# Patient Record
Sex: FEMALE | Race: BLACK OR AFRICAN AMERICAN | NOT HISPANIC OR LATINO | Employment: FULL TIME | URBAN - METROPOLITAN AREA
[De-identification: names, ages, dates, MRNs, and addresses within clinical notes are randomized per-mention and may not be internally consistent; named-entity substitution may affect disease eponyms.]

---

## 2017-02-08 LAB
HIGH RISK HPV 16 (PRECISION): POSITIVE
HIGH RISK HPV 18/45 (PRECISION): NEGATIVE
PAP RECOMMENDATION EXT: NORMAL
PAP SMEAR: NORMAL

## 2017-02-27 ENCOUNTER — HISTORICAL (OUTPATIENT)
Dept: RADIOLOGY | Facility: HOSPITAL | Age: 52
End: 2017-02-27

## 2017-03-14 ENCOUNTER — HISTORICAL (OUTPATIENT)
Dept: INTERNAL MEDICINE | Facility: CLINIC | Age: 52
End: 2017-03-14

## 2018-10-25 ENCOUNTER — HISTORICAL (OUTPATIENT)
Dept: ADMINISTRATIVE | Facility: HOSPITAL | Age: 53
End: 2018-10-25

## 2018-10-25 LAB
ABS NEUT (OLG): 2.92 X10(3)/MCL (ref 2.1–9.2)
ALBUMIN SERPL-MCNC: 3.9 GM/DL (ref 3.4–5)
ALBUMIN/GLOB SERPL: 1 RATIO (ref 1–2)
ALP SERPL-CCNC: 97 UNIT/L (ref 45–117)
ALT SERPL-CCNC: 43 UNIT/L (ref 12–78)
APPEARANCE, UA: CLEAR
AST SERPL-CCNC: 37 UNIT/L (ref 15–37)
BACTERIA #/AREA URNS AUTO: ABNORMAL /[HPF]
BASOPHILS # BLD AUTO: 0.03 X10(3)/MCL
BASOPHILS NFR BLD AUTO: 1 %
BILIRUB SERPL-MCNC: 0.5 MG/DL (ref 0.2–1)
BILIRUB UR QL STRIP: NEGATIVE
BILIRUBIN DIRECT+TOT PNL SERPL-MCNC: 0.2 MG/DL
BILIRUBIN DIRECT+TOT PNL SERPL-MCNC: 0.3 MG/DL
BUN SERPL-MCNC: 11 MG/DL (ref 7–18)
CALCIUM SERPL-MCNC: 9.2 MG/DL (ref 8.5–10.1)
CHLORIDE SERPL-SCNC: 106 MMOL/L (ref 98–107)
CHOLEST SERPL-MCNC: 218 MG/DL
CHOLEST/HDLC SERPL: 2.3 {RATIO} (ref 0–4.4)
CO2 SERPL-SCNC: 28 MMOL/L (ref 21–32)
COLOR UR: ABNORMAL
CREAT SERPL-MCNC: 0.8 MG/DL (ref 0.6–1.3)
CREAT UR-MCNC: <13 MG/DL
EOSINOPHIL # BLD AUTO: 0.06 X10(3)/MCL
EOSINOPHIL NFR BLD AUTO: 1 %
ERYTHROCYTE [DISTWIDTH] IN BLOOD BY AUTOMATED COUNT: 13.9 % (ref 11.5–14.5)
EST. AVERAGE GLUCOSE BLD GHB EST-MCNC: 108 MG/DL
GLOBULIN SER-MCNC: 4.3 GM/ML (ref 2.3–3.5)
GLUCOSE (UA): NORMAL
GLUCOSE SERPL-MCNC: 93 MG/DL (ref 74–106)
HAV IGM SERPL QL IA: NONREACTIVE
HBA1C MFR BLD: 5.4 % (ref 4.2–6.3)
HBV CORE IGM SERPL QL IA: NONREACTIVE
HBV SURFACE AG SERPL QL IA: NEGATIVE
HCT VFR BLD AUTO: 38.1 % (ref 35–46)
HCV AB SERPL QL IA: NONREACTIVE
HDLC SERPL-MCNC: 93 MG/DL
HGB BLD-MCNC: 12.3 GM/DL (ref 12–16)
HGB UR QL STRIP: NEGATIVE
HIV 1+2 AB+HIV1 P24 AG SERPL QL IA: NONREACTIVE
HYALINE CASTS #/AREA URNS LPF: ABNORMAL /[LPF]
IMM GRANULOCYTES # BLD AUTO: 0.01 10*3/UL
IMM GRANULOCYTES NFR BLD AUTO: 0 %
KETONES UR QL STRIP: NEGATIVE
LDLC SERPL CALC-MCNC: 114 MG/DL (ref 0–130)
LEUKOCYTE ESTERASE UR QL STRIP: 250 LEU/UL
LYMPHOCYTES # BLD AUTO: 1.99 X10(3)/MCL
LYMPHOCYTES NFR BLD AUTO: 37 % (ref 13–40)
MCH RBC QN AUTO: 28.3 PG (ref 26–34)
MCHC RBC AUTO-ENTMCNC: 32.3 GM/DL (ref 31–37)
MCV RBC AUTO: 87.8 FL (ref 80–100)
MICROALBUMIN UR-MCNC: <5 MG/L (ref 0–19)
MONOCYTES # BLD AUTO: 0.34 X10(3)/MCL
MONOCYTES NFR BLD AUTO: 6 % (ref 4–12)
NEUTROPHILS # BLD AUTO: 2.92 X10(3)/MCL
NEUTROPHILS NFR BLD AUTO: 54 X10(3)/MCL
NITRITE UR QL STRIP: NEGATIVE
PH UR STRIP: 7.5 [PH] (ref 4.5–8)
PLATELET # BLD AUTO: 195 X10(3)/MCL (ref 130–400)
PMV BLD AUTO: 11.7 FL (ref 7.4–10.4)
POTASSIUM SERPL-SCNC: 4.1 MMOL/L (ref 3.5–5.1)
PROT SERPL-MCNC: 8.2 GM/DL (ref 6.4–8.2)
PROT UR QL STRIP: NEGATIVE
RBC # BLD AUTO: 4.34 X10(6)/MCL (ref 4–5.2)
RBC #/AREA URNS AUTO: ABNORMAL /[HPF]
SODIUM SERPL-SCNC: 139 MMOL/L (ref 136–145)
SP GR UR STRIP: 1 (ref 1–1.03)
SQUAMOUS #/AREA URNS LPF: ABNORMAL /[LPF]
TRIGL SERPL-MCNC: 56 MG/DL
TSH SERPL-ACNC: 1.35 MIU/L (ref 0.36–3.74)
UROBILINOGEN UR STRIP-ACNC: NORMAL
VLDLC SERPL CALC-MCNC: 11 MG/DL
WBC # SPEC AUTO: 5.4 X10(3)/MCL (ref 4.5–11)
WBC #/AREA URNS AUTO: ABNORMAL /HPF

## 2018-10-29 ENCOUNTER — HISTORICAL (OUTPATIENT)
Dept: LAB | Facility: HOSPITAL | Age: 53
End: 2018-10-29

## 2019-06-05 ENCOUNTER — HISTORICAL (OUTPATIENT)
Dept: ADMINISTRATIVE | Facility: HOSPITAL | Age: 54
End: 2019-06-05

## 2020-03-09 ENCOUNTER — HISTORICAL (OUTPATIENT)
Dept: RADIOLOGY | Facility: HOSPITAL | Age: 55
End: 2020-03-09

## 2020-07-16 ENCOUNTER — HISTORICAL (OUTPATIENT)
Dept: LAB | Facility: HOSPITAL | Age: 55
End: 2020-07-16

## 2020-07-16 LAB
ABS NEUT (OLG): 3.69 X10(3)/MCL (ref 2.1–9.2)
ALBUMIN SERPL-MCNC: 3.6 GM/DL (ref 3.4–5)
ALBUMIN/GLOB SERPL: 0.8 RATIO (ref 1.1–2)
ALP SERPL-CCNC: 95 UNIT/L (ref 45–117)
ALT SERPL-CCNC: 23 UNIT/L (ref 12–78)
ANISOCYTOSIS BLD QL SMEAR: NORMAL
AST SERPL-CCNC: 22 UNIT/L (ref 15–37)
BASOPHILS # BLD AUTO: 0 X10(3)/MCL (ref 0–0.2)
BASOPHILS NFR BLD AUTO: 1 %
BILIRUB SERPL-MCNC: 0.6 MG/DL (ref 0.2–1)
BILIRUBIN DIRECT+TOT PNL SERPL-MCNC: 0.2 MG/DL (ref 0–0.2)
BILIRUBIN DIRECT+TOT PNL SERPL-MCNC: 0.4 MG/DL
BUN SERPL-MCNC: 8 MG/DL (ref 7–18)
CALCIUM SERPL-MCNC: 9 MG/DL (ref 8.5–10.1)
CHLORIDE SERPL-SCNC: 106 MMOL/L (ref 98–107)
CHOLEST SERPL-MCNC: 236 MG/DL
CHOLEST/HDLC SERPL: 3 {RATIO} (ref 0–4.4)
CO2 SERPL-SCNC: 24 MMOL/L (ref 21–32)
CREAT SERPL-MCNC: 1 MG/DL (ref 0.6–1.3)
EOSINOPHIL # BLD AUTO: 0.1 X10(3)/MCL (ref 0–0.9)
EOSINOPHIL NFR BLD AUTO: 2 %
ERYTHROCYTE [DISTWIDTH] IN BLOOD BY AUTOMATED COUNT: 18.6 % (ref 11.5–14.5)
EST. AVERAGE GLUCOSE BLD GHB EST-MCNC: 105 MG/DL
GLOBULIN SER-MCNC: 4.3 GM/ML (ref 2.3–3.5)
GLUCOSE SERPL-MCNC: 115 MG/DL (ref 74–106)
HBA1C MFR BLD: 5.3 % (ref 4.2–6.3)
HCT VFR BLD AUTO: 25.9 % (ref 35–46)
HDLC SERPL-MCNC: 80 MG/DL (ref 40–59)
HGB BLD-MCNC: 7.5 GM/DL (ref 12–16)
HYPOCHROMIA BLD QL SMEAR: NORMAL
IMM GRANULOCYTES # BLD AUTO: 0.01 10*3/UL
IMM GRANULOCYTES NFR BLD AUTO: 0 %
LDLC SERPL CALC-MCNC: 141 MG/DL
LYMPHOCYTES # BLD AUTO: 1.8 X10(3)/MCL (ref 0.6–4.6)
LYMPHOCYTES NFR BLD AUTO: 29 %
MCH RBC QN AUTO: 21.4 PG (ref 26–34)
MCHC RBC AUTO-ENTMCNC: 29 GM/DL (ref 31–37)
MCV RBC AUTO: 73.8 FL (ref 80–100)
MICROCYTES BLD QL SMEAR: NORMAL
MONOCYTES # BLD AUTO: 0.6 X10(3)/MCL (ref 0.1–1.3)
MONOCYTES NFR BLD AUTO: 10 %
NEUTROPHILS # BLD AUTO: 3.69 X10(3)/MCL (ref 2.1–9.2)
NEUTROPHILS NFR BLD AUTO: 59 %
PLATELET # BLD AUTO: 294 X10(3)/MCL (ref 130–400)
PLATELET # BLD EST: ADEQUATE 10*3/UL
PMV BLD AUTO: 10.3 FL (ref 7.4–10.4)
POIKILOCYTOSIS BLD QL SMEAR: NORMAL
POTASSIUM SERPL-SCNC: 3.8 MMOL/L (ref 3.5–5.1)
PROT SERPL-MCNC: 7.9 GM/DL (ref 6.4–8.2)
RBC # BLD AUTO: 3.51 X10(6)/MCL (ref 4–5.2)
RBC MORPH BLD: NORMAL
SODIUM SERPL-SCNC: 138 MMOL/L (ref 136–145)
TRIGL SERPL-MCNC: 77 MG/DL
TSH SERPL-ACNC: 0.83 MIU/L (ref 0.36–3.74)
VLDLC SERPL CALC-MCNC: 15 MG/DL
WBC # SPEC AUTO: 6.3 X10(3)/MCL (ref 4.5–11)

## 2020-11-17 ENCOUNTER — HISTORICAL (OUTPATIENT)
Dept: LAB | Facility: HOSPITAL | Age: 55
End: 2020-11-17

## 2021-05-26 ENCOUNTER — HISTORICAL (OUTPATIENT)
Dept: LAB | Facility: HOSPITAL | Age: 56
End: 2021-05-26

## 2021-05-26 LAB
ABS NEUT (OLG): 3.57 X10(3)/MCL (ref 2.1–9.2)
ANISOCYTOSIS BLD QL SMEAR: NORMAL
BASOPHILS NFR BLD MANUAL: 0 %
DACRYOCYTES BLD QL SMEAR: NORMAL
EOSINOPHIL NFR BLD MANUAL: 3 %
ERYTHROCYTE [DISTWIDTH] IN BLOOD BY AUTOMATED COUNT: ABNORMAL % (ref 11.5–14.5)
GRANULOCYTES NFR BLD MANUAL: 50 % (ref 43–75)
HCT VFR BLD AUTO: 29.2 % (ref 35–46)
HGB BLD-MCNC: 8.4 GM/DL (ref 12–16)
HYPOCHROMIA BLD QL SMEAR: NORMAL
LYMPHOCYTES NFR BLD MANUAL: 38 % (ref 20.5–51.1)
MCH RBC QN AUTO: 20.5 PG (ref 26–34)
MCHC RBC AUTO-ENTMCNC: 28.8 GM/DL (ref 31–37)
MCV RBC AUTO: 71.4 FL (ref 80–100)
MICROCYTES BLD QL SMEAR: NORMAL
MONOCYTES NFR BLD MANUAL: 9 % (ref 2–9)
PLATELET # BLD AUTO: 203 X10(3)/MCL (ref 130–400)
PLATELET # BLD EST: ADEQUATE 10*3/UL
PMV BLD AUTO: ABNORMAL FL (ref 7.4–10.4)
POIKILOCYTOSIS BLD QL SMEAR: NORMAL
POLYCHROMASIA BLD QL SMEAR: NORMAL
RBC # BLD AUTO: 4.09 X10(6)/MCL (ref 4–5.2)
RBC MORPH BLD: NORMAL
SCHISTOCYTES BLD QL AUTO: NORMAL
WBC # SPEC AUTO: 6.5 X10(3)/MCL (ref 4.5–11)

## 2021-07-28 LAB — CRC RECOMMENDATION EXT: NORMAL

## 2021-08-19 ENCOUNTER — HISTORICAL (OUTPATIENT)
Dept: RADIOLOGY | Facility: HOSPITAL | Age: 56
End: 2021-08-19

## 2021-08-19 LAB — POC CREATININE: 0.9 MG/DL (ref 0.6–1.3)

## 2021-09-07 ENCOUNTER — HISTORICAL (OUTPATIENT)
Dept: ADMINISTRATIVE | Facility: HOSPITAL | Age: 56
End: 2021-09-07

## 2021-09-07 LAB — SARS-COV-2 RNA RESP QL NAA+PROBE: NOT DETECTED

## 2021-10-18 ENCOUNTER — HISTORICAL (OUTPATIENT)
Dept: ADMINISTRATIVE | Facility: HOSPITAL | Age: 56
End: 2021-10-18

## 2021-10-18 LAB
ABS NEUT (OLG): 5.09 X10(3)/MCL (ref 2.1–9.2)
ALBUMIN SERPL-MCNC: 4.4 GM/DL (ref 3.5–5)
ALBUMIN/GLOB SERPL: 1.3 RATIO (ref 1.1–2)
ALP SERPL-CCNC: 112 UNIT/L (ref 40–150)
ALT SERPL-CCNC: 28 UNIT/L (ref 0–55)
AST SERPL-CCNC: 26 UNIT/L (ref 5–34)
BASOPHILS # BLD AUTO: 0 X10(3)/MCL (ref 0–0.2)
BASOPHILS NFR BLD AUTO: 0.3 %
BILIRUB SERPL-MCNC: 0.8 MG/DL
BILIRUBIN DIRECT+TOT PNL SERPL-MCNC: 0.3 MG/DL (ref 0–0.5)
BILIRUBIN DIRECT+TOT PNL SERPL-MCNC: 0.5 MG/DL (ref 0–0.8)
BUN SERPL-MCNC: 9.5 MG/DL (ref 9.8–20.1)
CALCIUM SERPL-MCNC: 10 MG/DL (ref 8.7–10.5)
CHLORIDE SERPL-SCNC: 106 MMOL/L (ref 98–107)
CO2 SERPL-SCNC: 26 MMOL/L (ref 22–29)
CREAT SERPL-MCNC: 0.8 MG/DL (ref 0.55–1.02)
EOSINOPHIL # BLD AUTO: 0 X10(3)/MCL (ref 0–0.9)
EOSINOPHIL NFR BLD AUTO: 0.7 %
ERYTHROCYTE [DISTWIDTH] IN BLOOD BY AUTOMATED COUNT: 16.2 % (ref 11.5–17)
FERRITIN SERPL-MCNC: 24.8 NG/ML (ref 4.63–204)
FOLATE SERPL-MCNC: 17.6 NG/ML (ref 7–31.4)
GLOBULIN SER-MCNC: 3.5 GM/DL (ref 2.4–3.5)
GLUCOSE SERPL-MCNC: 88 MG/DL (ref 74–100)
HCT VFR BLD AUTO: 37 % (ref 37–47)
HGB BLD-MCNC: 11.3 GM/DL (ref 12–16)
IRON SATN MFR SERPL: 14 % (ref 20–50)
IRON SERPL-MCNC: 49 UG/DL (ref 50–170)
LYMPHOCYTES # BLD AUTO: 1.3 X10(3)/MCL (ref 0.6–4.6)
LYMPHOCYTES NFR BLD AUTO: 19.2 %
MCH RBC QN AUTO: 27.3 PG (ref 27–31)
MCHC RBC AUTO-ENTMCNC: 30.5 GM/DL (ref 33–36)
MCV RBC AUTO: 89.4 FL (ref 80–94)
MONOCYTES # BLD AUTO: 0.4 X10(3)/MCL (ref 0.1–1.3)
MONOCYTES NFR BLD AUTO: 5.3 %
NEUTROPHILS # BLD AUTO: 5.1 X10(3)/MCL (ref 2.1–9.2)
NEUTROPHILS NFR BLD AUTO: 74.4 %
PLATELET # BLD AUTO: 268 X10(3)/MCL (ref 130–400)
PMV BLD AUTO: 10.7 FL (ref 9.4–12.4)
POTASSIUM SERPL-SCNC: 4 MMOL/L (ref 3.5–5.1)
PROT SERPL-MCNC: 7.9 GM/DL (ref 6.4–8.3)
RBC # BLD AUTO: 4.14 X10(6)/MCL (ref 4.2–5.4)
SODIUM SERPL-SCNC: 142 MMOL/L (ref 136–145)
TIBC SERPL-MCNC: 310 UG/DL (ref 70–310)
TIBC SERPL-MCNC: 359 UG/DL (ref 250–450)
TRANSFERRIN SERPL-MCNC: 341 MG/DL (ref 180–382)
VIT B12 SERPL-MCNC: 1467 PG/ML (ref 213–816)
WBC # SPEC AUTO: 6.8 X10(3)/MCL (ref 4.5–11.5)

## 2021-10-26 ENCOUNTER — HISTORICAL (OUTPATIENT)
Dept: PREADMISSION TESTING | Facility: HOSPITAL | Age: 56
End: 2021-10-26

## 2021-10-26 LAB — SARS-COV-2 RNA RESP QL NAA+PROBE: NOT DETECTED

## 2021-10-29 ENCOUNTER — HISTORICAL (OUTPATIENT)
Dept: SURGERY | Facility: HOSPITAL | Age: 56
End: 2021-10-29

## 2021-11-01 ENCOUNTER — HISTORICAL (OUTPATIENT)
Dept: INFUSION THERAPY | Facility: HOSPITAL | Age: 56
End: 2021-11-01

## 2021-11-03 ENCOUNTER — HISTORICAL (OUTPATIENT)
Dept: INFUSION THERAPY | Facility: HOSPITAL | Age: 56
End: 2021-11-03

## 2021-11-08 ENCOUNTER — HISTORICAL (OUTPATIENT)
Dept: ADMINISTRATIVE | Facility: HOSPITAL | Age: 56
End: 2021-11-08

## 2021-11-08 LAB
ABS NEUT (OLG): 4.18 X10(3)/MCL (ref 2.1–9.2)
BASOPHILS # BLD AUTO: 0 X10(3)/MCL (ref 0–0.2)
BASOPHILS NFR BLD AUTO: 0.3 %
EOSINOPHIL # BLD AUTO: 0.1 X10(3)/MCL (ref 0–0.9)
EOSINOPHIL NFR BLD AUTO: 1.1 %
ERYTHROCYTE [DISTWIDTH] IN BLOOD BY AUTOMATED COUNT: 15.6 % (ref 11.5–17)
HCT VFR BLD AUTO: 37.2 % (ref 37–47)
HGB BLD-MCNC: 11.7 GM/DL (ref 12–16)
LYMPHOCYTES # BLD AUTO: 1.7 X10(3)/MCL (ref 0.6–4.6)
LYMPHOCYTES NFR BLD AUTO: 26.5 %
MCH RBC QN AUTO: 27.3 PG (ref 27–31)
MCHC RBC AUTO-ENTMCNC: 31.5 GM/DL (ref 33–36)
MCV RBC AUTO: 86.7 FL (ref 80–94)
MONOCYTES # BLD AUTO: 0.4 X10(3)/MCL (ref 0.1–1.3)
MONOCYTES NFR BLD AUTO: 6.1 %
NEUTROPHILS # BLD AUTO: 4.2 X10(3)/MCL (ref 2.1–9.2)
NEUTROPHILS NFR BLD AUTO: 65.7 %
PLATELET # BLD AUTO: 188 X10(3)/MCL (ref 130–400)
PMV BLD AUTO: 11.5 FL (ref 9.4–12.4)
RBC # BLD AUTO: 4.29 X10(6)/MCL (ref 4.2–5.4)
WBC # SPEC AUTO: 6.4 X10(3)/MCL (ref 4.5–11.5)

## 2021-11-15 ENCOUNTER — HISTORICAL (OUTPATIENT)
Dept: INFUSION THERAPY | Facility: HOSPITAL | Age: 56
End: 2021-11-15

## 2021-11-15 LAB
ANION GAP SERPL CALC-SCNC: 17 MMOL/L
BUN SERPL-MCNC: 8 MG/DL (ref 8–26)
CHLORIDE SERPL-SCNC: 102 MMOL/L (ref 98–109)
CREAT SERPL-MCNC: 0.8 MG/DL (ref 0.6–1.3)
GLUCOSE SERPL-MCNC: 97 MG/DL (ref 70–105)
HCT VFR BLD CALC: 39 % (ref 38–51)
HGB BLD-MCNC: 13.3 MG/DL (ref 12–17)
POC IONIZED CALCIUM: 1.24 MMOL/L (ref 1.12–1.32)
POC TCO2: 25 MMOL/L (ref 24–29)
POTASSIUM BLD-SCNC: 4.2 MMOL/L (ref 3.5–4.9)
SODIUM BLD-SCNC: 140 MMOL/L (ref 138–146)

## 2021-11-17 ENCOUNTER — HISTORICAL (OUTPATIENT)
Dept: INFUSION THERAPY | Facility: HOSPITAL | Age: 56
End: 2021-11-17

## 2021-11-29 ENCOUNTER — HISTORICAL (OUTPATIENT)
Dept: ADMINISTRATIVE | Facility: HOSPITAL | Age: 56
End: 2021-11-29

## 2021-11-29 LAB
ABS NEUT (OLG): 3.28 X10(3)/MCL (ref 2.1–9.2)
ALP SERPL-CCNC: 117 UNIT/L (ref 40–150)
ALT SERPL-CCNC: 17 UNIT/L (ref 0–55)
ANION GAP SERPL CALC-SCNC: 18 MMOL/L
AST SERPL-CCNC: 27 UNIT/L (ref 5–34)
BASOPHILS # BLD AUTO: 0 X10(3)/MCL (ref 0–0.2)
BASOPHILS NFR BLD AUTO: 0.4 %
BUN SERPL-MCNC: 13 MG/DL (ref 8–26)
CHLORIDE SERPL-SCNC: 103 MMOL/L (ref 98–109)
CREAT SERPL-MCNC: 0.8 MG/DL (ref 0.6–1.3)
EOSINOPHIL # BLD AUTO: 0 X10(3)/MCL (ref 0–0.9)
EOSINOPHIL NFR BLD AUTO: 0.6 %
ERYTHROCYTE [DISTWIDTH] IN BLOOD BY AUTOMATED COUNT: 17.1 % (ref 11.5–17)
GLUCOSE SERPL-MCNC: 107 MG/DL (ref 70–105)
HCT VFR BLD AUTO: 39.1 % (ref 37–47)
HCT VFR BLD CALC: 41 % (ref 38–51)
HGB BLD-MCNC: 12.4 GM/DL (ref 12–16)
HGB BLD-MCNC: 13.9 MG/DL (ref 12–17)
LYMPHOCYTES # BLD AUTO: 1.3 X10(3)/MCL (ref 0.6–4.6)
LYMPHOCYTES NFR BLD AUTO: 26.1 %
MCH RBC QN AUTO: 27.3 PG (ref 27–31)
MCHC RBC AUTO-ENTMCNC: 31.7 GM/DL (ref 33–36)
MCV RBC AUTO: 85.9 FL (ref 80–94)
MONOCYTES # BLD AUTO: 0.4 X10(3)/MCL (ref 0.1–1.3)
MONOCYTES NFR BLD AUTO: 8.4 %
NEUTROPHILS # BLD AUTO: 3.3 X10(3)/MCL (ref 2.1–9.2)
NEUTROPHILS NFR BLD AUTO: 64.3 %
PLATELET # BLD AUTO: 127 X10(3)/MCL (ref 130–400)
PMV BLD AUTO: 9.8 FL (ref 9.4–12.4)
POC IONIZED CALCIUM: 1.26 MMOL/L (ref 1.12–1.32)
POC TCO2: 25 MMOL/L (ref 24–29)
POTASSIUM BLD-SCNC: 3.5 MMOL/L (ref 3.5–4.9)
RBC # BLD AUTO: 4.55 X10(6)/MCL (ref 4.2–5.4)
SODIUM BLD-SCNC: 141 MMOL/L (ref 138–146)
WBC # SPEC AUTO: 5.1 X10(3)/MCL (ref 4.5–11.5)

## 2021-11-30 ENCOUNTER — HISTORICAL (OUTPATIENT)
Dept: INFUSION THERAPY | Facility: HOSPITAL | Age: 56
End: 2021-11-30

## 2021-12-02 ENCOUNTER — HISTORICAL (OUTPATIENT)
Dept: INFUSION THERAPY | Facility: HOSPITAL | Age: 56
End: 2021-12-02

## 2021-12-13 ENCOUNTER — HISTORICAL (OUTPATIENT)
Dept: ADMINISTRATIVE | Facility: HOSPITAL | Age: 56
End: 2021-12-13

## 2021-12-13 LAB
ABS NEUT (OLG): 2.51 X10(3)/MCL (ref 2.1–9.2)
ALP SERPL-CCNC: 116 UNIT/L (ref 40–150)
ALT SERPL-CCNC: 30 UNIT/L (ref 0–55)
ANION GAP SERPL CALC-SCNC: 16 MMOL/L
AST SERPL-CCNC: 38 UNIT/L (ref 5–34)
BASOPHILS # BLD AUTO: 0 X10(3)/MCL (ref 0–0.2)
BASOPHILS NFR BLD AUTO: 0.2 %
BUN SERPL-MCNC: 10 MG/DL (ref 8–26)
CHLORIDE SERPL-SCNC: 104 MMOL/L (ref 98–109)
CREAT SERPL-MCNC: 0.9 MG/DL (ref 0.6–1.3)
EOSINOPHIL # BLD AUTO: 0.1 X10(3)/MCL (ref 0–0.9)
EOSINOPHIL NFR BLD AUTO: 1.7 %
ERYTHROCYTE [DISTWIDTH] IN BLOOD BY AUTOMATED COUNT: 17.7 % (ref 11.5–17)
GLUCOSE SERPL-MCNC: 74 MG/DL (ref 70–105)
HCT VFR BLD AUTO: 37.9 % (ref 37–47)
HCT VFR BLD CALC: 40 % (ref 38–51)
HGB BLD-MCNC: 12 GM/DL (ref 12–16)
HGB BLD-MCNC: 13.6 MG/DL (ref 12–17)
LYMPHOCYTES # BLD AUTO: 1.1 X10(3)/MCL (ref 0.6–4.6)
LYMPHOCYTES NFR BLD AUTO: 25.5 %
MCH RBC QN AUTO: 27.5 PG (ref 27–31)
MCHC RBC AUTO-ENTMCNC: 31.7 GM/DL (ref 33–36)
MCV RBC AUTO: 86.7 FL (ref 80–94)
MONOCYTES # BLD AUTO: 0.5 X10(3)/MCL (ref 0.1–1.3)
MONOCYTES NFR BLD AUTO: 12.3 %
NEUTROPHILS # BLD AUTO: 2.5 X10(3)/MCL (ref 2.1–9.2)
NEUTROPHILS NFR BLD AUTO: 60.3 %
PLATELET # BLD AUTO: 147 X10(3)/MCL (ref 130–400)
PMV BLD AUTO: 10 FL (ref 9.4–12.4)
POC IONIZED CALCIUM: 1.24 MMOL/L (ref 1.12–1.32)
POC TCO2: 26 MMOL/L (ref 24–29)
POTASSIUM BLD-SCNC: 3.8 MMOL/L (ref 3.5–4.9)
RBC # BLD AUTO: 4.37 X10(6)/MCL (ref 4.2–5.4)
SODIUM BLD-SCNC: 141 MMOL/L (ref 138–146)
WBC # SPEC AUTO: 4.2 X10(3)/MCL (ref 4.5–11.5)

## 2021-12-14 ENCOUNTER — HISTORICAL (OUTPATIENT)
Dept: INFUSION THERAPY | Facility: HOSPITAL | Age: 56
End: 2021-12-14

## 2021-12-16 ENCOUNTER — HISTORICAL (OUTPATIENT)
Dept: INFUSION THERAPY | Facility: HOSPITAL | Age: 56
End: 2021-12-16

## 2021-12-27 ENCOUNTER — HISTORICAL (OUTPATIENT)
Dept: ADMINISTRATIVE | Facility: HOSPITAL | Age: 56
End: 2021-12-27

## 2021-12-27 LAB
ABS NEUT (OLG): 2.65 X10(3)/MCL (ref 2.1–9.2)
ALT SERPL-CCNC: 52 UNIT/L (ref 0–55)
ANION GAP SERPL CALC-SCNC: 16 MMOL/L
AST SERPL-CCNC: 55 UNIT/L (ref 5–34)
BASOPHILS # BLD AUTO: 0 X10(3)/MCL (ref 0–0.2)
BASOPHILS NFR BLD AUTO: 0.4 %
BUN SERPL-MCNC: 8 MG/DL (ref 8–26)
CHLORIDE SERPL-SCNC: 102 MMOL/L (ref 98–109)
CREAT SERPL-MCNC: 0.8 MG/DL (ref 0.6–1.3)
EOSINOPHIL # BLD AUTO: 0 X10(3)/MCL (ref 0–0.9)
EOSINOPHIL NFR BLD AUTO: 0.8 %
ERYTHROCYTE [DISTWIDTH] IN BLOOD BY AUTOMATED COUNT: 18.3 % (ref 11.5–17)
GLUCOSE SERPL-MCNC: 88 MG/DL (ref 70–105)
HCT VFR BLD AUTO: 37.4 % (ref 37–47)
HCT VFR BLD CALC: 40 % (ref 38–51)
HGB BLD-MCNC: 12.1 GM/DL (ref 12–16)
HGB BLD-MCNC: 13.6 MG/DL (ref 12–17)
LYMPHOCYTES # BLD AUTO: 1.4 X10(3)/MCL (ref 0.6–4.6)
LYMPHOCYTES NFR BLD AUTO: 29.4 %
MCH RBC QN AUTO: 28.1 PG (ref 27–31)
MCHC RBC AUTO-ENTMCNC: 32.4 GM/DL (ref 33–36)
MCV RBC AUTO: 87 FL (ref 80–94)
MONOCYTES # BLD AUTO: 0.7 X10(3)/MCL (ref 0.1–1.3)
MONOCYTES NFR BLD AUTO: 14.6 %
NEUTROPHILS # BLD AUTO: 2.6 X10(3)/MCL (ref 2.1–9.2)
NEUTROPHILS NFR BLD AUTO: 54.6 %
PLATELET # BLD AUTO: 94 X10(3)/MCL (ref 130–400)
PMV BLD AUTO: 9.7 FL (ref 9.4–12.4)
POC IONIZED CALCIUM: 1.25 MMOL/L (ref 1.12–1.32)
POC TCO2: 26 MMOL/L (ref 24–29)
POTASSIUM BLD-SCNC: 3.5 MMOL/L (ref 3.5–4.9)
RBC # BLD AUTO: 4.3 X10(6)/MCL (ref 4.2–5.4)
SODIUM BLD-SCNC: 140 MMOL/L (ref 138–146)
WBC # SPEC AUTO: 4.9 X10(3)/MCL (ref 4.5–11.5)

## 2021-12-28 ENCOUNTER — HISTORICAL (OUTPATIENT)
Dept: INFUSION THERAPY | Facility: HOSPITAL | Age: 56
End: 2021-12-28

## 2021-12-30 ENCOUNTER — HISTORICAL (OUTPATIENT)
Dept: INFUSION THERAPY | Facility: HOSPITAL | Age: 56
End: 2021-12-30

## 2022-01-10 ENCOUNTER — HISTORICAL (OUTPATIENT)
Dept: ADMINISTRATIVE | Facility: HOSPITAL | Age: 57
End: 2022-01-10

## 2022-01-10 LAB
ABS NEUT (OLG): 2.14 X10(3)/MCL (ref 2.1–9.2)
ALBUMIN SERPL-MCNC: 3.8 GM/DL (ref 3.5–5)
ALBUMIN/GLOB SERPL: 1 RATIO (ref 1.1–2)
ALP SERPL-CCNC: 128 UNIT/L (ref 40–150)
ALT SERPL-CCNC: 43 UNIT/L (ref 0–55)
AST SERPL-CCNC: 59 UNIT/L (ref 5–34)
BASOPHILS # BLD AUTO: 0 X10(3)/MCL (ref 0–0.2)
BASOPHILS NFR BLD AUTO: 0.3 %
BILIRUB SERPL-MCNC: 0.6 MG/DL
BILIRUBIN DIRECT+TOT PNL SERPL-MCNC: 0.3 MG/DL (ref 0–0.5)
BILIRUBIN DIRECT+TOT PNL SERPL-MCNC: 0.3 MG/DL (ref 0–0.8)
BUN SERPL-MCNC: 9.5 MG/DL (ref 9.8–20.1)
CALCIUM SERPL-MCNC: 9.2 MG/DL (ref 8.7–10.5)
CHLORIDE SERPL-SCNC: 107 MMOL/L (ref 98–107)
CO2 SERPL-SCNC: 23 MMOL/L (ref 22–29)
CREAT SERPL-MCNC: 0.82 MG/DL (ref 0.55–1.02)
EOSINOPHIL # BLD AUTO: 0.1 X10(3)/MCL (ref 0–0.9)
EOSINOPHIL NFR BLD AUTO: 1.8 %
ERYTHROCYTE [DISTWIDTH] IN BLOOD BY AUTOMATED COUNT: 18.7 % (ref 11.5–17)
GLOBULIN SER-MCNC: 3.7 GM/DL (ref 2.4–3.5)
GLUCOSE SERPL-MCNC: 85 MG/DL (ref 74–100)
HCT VFR BLD AUTO: 37.5 % (ref 37–47)
HGB BLD-MCNC: 12.2 GM/DL (ref 12–16)
LYMPHOCYTES # BLD AUTO: 1.2 X10(3)/MCL (ref 0.6–4.6)
LYMPHOCYTES NFR BLD AUTO: 29 %
MCH RBC QN AUTO: 28.6 PG (ref 27–31)
MCHC RBC AUTO-ENTMCNC: 32.5 GM/DL (ref 33–36)
MCV RBC AUTO: 88 FL (ref 80–94)
MONOCYTES # BLD AUTO: 0.6 X10(3)/MCL (ref 0.1–1.3)
MONOCYTES NFR BLD AUTO: 15.1 %
NEUTROPHILS # BLD AUTO: 2.1 X10(3)/MCL (ref 2.1–9.2)
NEUTROPHILS NFR BLD AUTO: 53.8 %
PLATELET # BLD AUTO: 92 X10(3)/MCL (ref 130–400)
PMV BLD AUTO: 11.6 FL (ref 9.4–12.4)
POTASSIUM SERPL-SCNC: 3.4 MMOL/L (ref 3.5–5.1)
PROT SERPL-MCNC: 7.5 GM/DL (ref 6.4–8.3)
RBC # BLD AUTO: 4.26 X10(6)/MCL (ref 4.2–5.4)
SODIUM SERPL-SCNC: 141 MMOL/L (ref 136–145)
WBC # SPEC AUTO: 4 X10(3)/MCL (ref 4.5–11.5)

## 2022-01-11 ENCOUNTER — HISTORICAL (OUTPATIENT)
Dept: INFUSION THERAPY | Facility: HOSPITAL | Age: 57
End: 2022-01-11

## 2022-01-13 ENCOUNTER — HISTORICAL (OUTPATIENT)
Dept: INFUSION THERAPY | Facility: HOSPITAL | Age: 57
End: 2022-01-13

## 2022-01-24 ENCOUNTER — HISTORICAL (OUTPATIENT)
Dept: ADMINISTRATIVE | Facility: HOSPITAL | Age: 57
End: 2022-01-24

## 2022-01-24 LAB
ABS NEUT (OLG): 1.9 X10(3)/MCL (ref 2.1–9.2)
ALP SERPL-CCNC: 150 UNIT/L (ref 40–150)
ALT SERPL-CCNC: 45 UNIT/L (ref 0–55)
ANION GAP SERPL CALC-SCNC: 15 MMOL/L
AST SERPL-CCNC: 54 UNIT/L (ref 5–34)
BASOPHILS # BLD AUTO: 0 X10(3)/MCL (ref 0–0.2)
BASOPHILS NFR BLD AUTO: 0.5 %
BUN SERPL-MCNC: 4 MG/DL (ref 8–26)
CHLORIDE SERPL-SCNC: 104 MMOL/L (ref 98–109)
CREAT SERPL-MCNC: 0.7 MG/DL (ref 0.6–1.3)
EOSINOPHIL # BLD AUTO: 0 X10(3)/MCL (ref 0–0.9)
EOSINOPHIL NFR BLD AUTO: 1.3 %
ERYTHROCYTE [DISTWIDTH] IN BLOOD BY AUTOMATED COUNT: 18.9 % (ref 11.5–17)
GLUCOSE SERPL-MCNC: 84 MG/DL (ref 70–105)
HCT VFR BLD AUTO: 37.1 % (ref 37–47)
HCT VFR BLD CALC: 40 % (ref 38–51)
HGB BLD-MCNC: 11.9 GM/DL (ref 12–16)
HGB BLD-MCNC: 13.6 MG/DL (ref 12–17)
LYMPHOCYTES # BLD AUTO: 1.2 X10(3)/MCL (ref 0.6–4.6)
LYMPHOCYTES NFR BLD AUTO: 31.5 %
MCH RBC QN AUTO: 28.7 PG (ref 27–31)
MCHC RBC AUTO-ENTMCNC: 32.1 GM/DL (ref 33–36)
MCV RBC AUTO: 89.6 FL (ref 80–94)
MONOCYTES # BLD AUTO: 0.7 X10(3)/MCL (ref 0.1–1.3)
MONOCYTES NFR BLD AUTO: 18.5 %
NEUTROPHILS # BLD AUTO: 1.9 X10(3)/MCL (ref 2.1–9.2)
NEUTROPHILS NFR BLD AUTO: 48.2 %
PLATELET # BLD AUTO: 97 X10(3)/MCL (ref 130–400)
PMV BLD AUTO: ABNORMAL FL (ref 9.4–12.4)
POC IONIZED CALCIUM: 1.24 MMOL/L (ref 1.12–1.32)
POC TCO2: 26 MMOL/L (ref 24–29)
POTASSIUM BLD-SCNC: 3.4 MMOL/L (ref 3.5–4.9)
RBC # BLD AUTO: 4.14 X10(6)/MCL (ref 4.2–5.4)
SODIUM BLD-SCNC: 140 MMOL/L (ref 138–146)
WBC # SPEC AUTO: 3.9 X10(3)/MCL (ref 4.5–11.5)

## 2022-01-25 ENCOUNTER — HISTORICAL (OUTPATIENT)
Dept: INFUSION THERAPY | Facility: HOSPITAL | Age: 57
End: 2022-01-25

## 2022-01-27 ENCOUNTER — HISTORICAL (OUTPATIENT)
Dept: INFUSION THERAPY | Facility: HOSPITAL | Age: 57
End: 2022-01-27

## 2022-02-07 ENCOUNTER — HISTORICAL (OUTPATIENT)
Dept: ADMINISTRATIVE | Facility: HOSPITAL | Age: 57
End: 2022-02-07

## 2022-02-07 LAB
ABS NEUT (OLG): 1.36 (ref 2.1–9.2)
ALBUMIN SERPL-MCNC: 3.8 G/DL (ref 3.5–5)
ALBUMIN/GLOB SERPL: 1 {RATIO} (ref 1.1–2)
ALP SERPL-CCNC: 147 U/L (ref 40–150)
ALT SERPL-CCNC: 40 U/L (ref 0–55)
AST SERPL-CCNC: 56 U/L (ref 5–34)
BASOPHILS # BLD AUTO: 0 10*3/UL (ref 0–0.2)
BASOPHILS NFR BLD AUTO: 0.6 %
BILIRUB SERPL-MCNC: 0.6 MG/DL
BILIRUBIN DIRECT+TOT PNL SERPL-MCNC: 0.3 (ref 0–0.5)
BILIRUBIN DIRECT+TOT PNL SERPL-MCNC: 0.3 (ref 0–0.8)
BUN SERPL-MCNC: 9.7 MG/DL (ref 9.8–20.1)
CALCIUM SERPL-MCNC: 9.7 MG/DL (ref 8.7–10.5)
CHLORIDE SERPL-SCNC: 106 MMOL/L (ref 98–107)
CO2 SERPL-SCNC: 25 MMOL/L (ref 22–29)
CREAT SERPL-MCNC: 0.8 MG/DL (ref 0.55–1.02)
EOSINOPHIL # BLD AUTO: 0 10*3/UL (ref 0–0.9)
EOSINOPHIL NFR BLD AUTO: 1.5 %
ERYTHROCYTE [DISTWIDTH] IN BLOOD BY AUTOMATED COUNT: 19 % (ref 11.5–17)
GLOBULIN SER-MCNC: 3.8 G/DL (ref 2.4–3.5)
GLUCOSE SERPL-MCNC: 80 MG/DL (ref 74–100)
HCT VFR BLD AUTO: 36.8 % (ref 37–47)
HEMOLYSIS INTERF INDEX SERPL-ACNC: 9
HGB BLD-MCNC: 11.9 G/DL (ref 12–16)
ICTERIC INTERF INDEX SERPL-ACNC: 1
LIPEMIC INTERF INDEX SERPL-ACNC: 3
LYMPHOCYTES # BLD AUTO: 1 10*3/UL (ref 0.6–4.6)
LYMPHOCYTES NFR BLD AUTO: 30.8 %
MANUAL DIFF? (OHS): NO
MCH RBC QN AUTO: 29.4 PG (ref 27–31)
MCHC RBC AUTO-ENTMCNC: 32.3 G/DL (ref 33–36)
MCV RBC AUTO: 90.9 FL (ref 80–94)
MONOCYTES # BLD AUTO: 0.8 10*3/UL (ref 0.1–1.3)
MONOCYTES NFR BLD AUTO: 25.6 %
NEUTROPHILS # BLD AUTO: 1.4 10*3/UL (ref 2.1–9.2)
NEUTROPHILS NFR BLD AUTO: 41.5 %
PLATELET # BLD AUTO: 70 10*3/UL (ref 130–400)
PMV BLD AUTO: NORMAL FL (ref 9.4–12.4)
POTASSIUM SERPL-SCNC: 3.5 MMOL/L (ref 3.5–5.1)
PROT SERPL-MCNC: 7.6 G/DL (ref 6.4–8.3)
RBC # BLD AUTO: 4.05 10*6/UL (ref 4.2–5.4)
SODIUM SERPL-SCNC: 140 MMOL/L (ref 136–145)
WBC # SPEC AUTO: 3.3 10*3/UL (ref 4.5–11.5)

## 2022-02-08 ENCOUNTER — HISTORICAL (OUTPATIENT)
Dept: INFUSION THERAPY | Facility: HOSPITAL | Age: 57
End: 2022-02-08

## 2022-02-10 ENCOUNTER — HISTORICAL (OUTPATIENT)
Dept: INFUSION THERAPY | Facility: HOSPITAL | Age: 57
End: 2022-02-10

## 2022-02-18 ENCOUNTER — HISTORICAL (OUTPATIENT)
Dept: HEMATOLOGY/ONCOLOGY | Facility: CLINIC | Age: 57
End: 2022-02-18

## 2022-02-18 LAB
ABS NEUT (OLG): 1.49 (ref 2.1–9.2)
ALBUMIN SERPL-MCNC: 3.8 G/DL (ref 3.5–5)
ALBUMIN/GLOB SERPL: 1 {RATIO} (ref 1.1–2)
ALP SERPL-CCNC: 141 U/L (ref 40–150)
ALT SERPL-CCNC: 54 U/L (ref 0–55)
AST SERPL-CCNC: 62 U/L (ref 5–34)
BASOPHILS # BLD AUTO: 0 10*3/UL (ref 0–0.2)
BASOPHILS NFR BLD AUTO: 0.3 %
BILIRUB SERPL-MCNC: 0.7 MG/DL
BILIRUBIN DIRECT+TOT PNL SERPL-MCNC: 0.3 (ref 0–0.5)
BILIRUBIN DIRECT+TOT PNL SERPL-MCNC: 0.4 (ref 0–0.8)
BUN SERPL-MCNC: 7.9 MG/DL (ref 9.8–20.1)
CALCIUM SERPL-MCNC: 9.7 MG/DL (ref 8.7–10.5)
CEA SERPL-MCNC: <1.73 NG/ML (ref 0–3)
CHLORIDE SERPL-SCNC: 104 MMOL/L (ref 98–107)
CO2 SERPL-SCNC: 23 MMOL/L (ref 22–29)
CREAT SERPL-MCNC: 0.9 MG/DL (ref 0.55–1.02)
EOSINOPHIL # BLD AUTO: 0 10*3/UL (ref 0–0.9)
EOSINOPHIL NFR BLD AUTO: 0.6 %
ERYTHROCYTE [DISTWIDTH] IN BLOOD BY AUTOMATED COUNT: 19.8 % (ref 11.5–17)
FERRITIN SERPL-MCNC: 205.78 NG/ML (ref 4.63–204)
GLOBULIN SER-MCNC: 3.8 G/DL (ref 2.4–3.5)
GLUCOSE SERPL-MCNC: 88 MG/DL (ref 74–100)
HCT VFR BLD AUTO: 36.6 % (ref 37–47)
HEMOLYSIS INTERF INDEX SERPL-ACNC: 9
HGB BLD-MCNC: 11.8 G/DL (ref 12–16)
ICTERIC INTERF INDEX SERPL-ACNC: 1
IRON SATN MFR SERPL: 29 % (ref 20–50)
IRON SERPL-MCNC: 113 UG/DL (ref 50–170)
LIPEMIC INTERF INDEX SERPL-ACNC: 9
LYMPHOCYTES # BLD AUTO: 1.4 10*3/UL (ref 0.6–4.6)
LYMPHOCYTES NFR BLD AUTO: 41.9 %
MANUAL DIFF? (OHS): NO
MCH RBC QN AUTO: 29.9 PG (ref 27–31)
MCHC RBC AUTO-ENTMCNC: 32.2 G/DL (ref 33–36)
MCV RBC AUTO: 92.9 FL (ref 80–94)
MONOCYTES # BLD AUTO: 0.5 10*3/UL (ref 0.1–1.3)
MONOCYTES NFR BLD AUTO: 13.7 %
NEUTROPHILS # BLD AUTO: 1.5 10*3/UL (ref 2.1–9.2)
NEUTROPHILS NFR BLD AUTO: 43.2 %
PLATELET # BLD AUTO: 100 10*3/UL (ref 130–400)
PMV BLD AUTO: 11.9 FL (ref 9.4–12.4)
POTASSIUM SERPL-SCNC: 3.6 MMOL/L (ref 3.5–5.1)
PROT SERPL-MCNC: 7.6 G/DL (ref 6.4–8.3)
RBC # BLD AUTO: 3.94 10*6/UL (ref 4.2–5.4)
SODIUM SERPL-SCNC: 141 MMOL/L (ref 136–145)
TIBC SERPL-MCNC: 272 UG/DL (ref 70–310)
TIBC SERPL-MCNC: 385 UG/DL (ref 250–450)
TRANSFERRIN SERPL-MCNC: 350 MG/DL (ref 180–382)
WBC # SPEC AUTO: 3.4 10*3/UL (ref 4.5–11.5)

## 2022-02-22 ENCOUNTER — HISTORICAL (OUTPATIENT)
Dept: INFUSION THERAPY | Facility: HOSPITAL | Age: 57
End: 2022-02-22

## 2022-02-24 ENCOUNTER — HISTORICAL (OUTPATIENT)
Dept: INFUSION THERAPY | Facility: HOSPITAL | Age: 57
End: 2022-02-24

## 2022-03-04 DIAGNOSIS — C18.2 MALIGNANT NEOPLASM OF ASCENDING COLON: ICD-10-CM

## 2022-03-07 ENCOUNTER — HISTORICAL (OUTPATIENT)
Dept: ADMINISTRATIVE | Facility: HOSPITAL | Age: 57
End: 2022-03-07

## 2022-03-07 LAB
ABS NEUT (OLG): 2.44 (ref 2.1–9.2)
ALP SERPL-CCNC: 152 U/L (ref 40–150)
ALT SERPL-CCNC: 43 U/L (ref 0–55)
ANION GAP SERPL CALC-SCNC: 14 MMOL/L
AST SERPL-CCNC: 51 U/L (ref 5–34)
BASOPHILS # BLD AUTO: 0 10*3/UL (ref 0–0.2)
BASOPHILS NFR BLD AUTO: 0.2 %
BUN SERPL-MCNC: 11 MG/DL (ref 8–26)
CHLORIDE SERPL-SCNC: 105 MMOL/L (ref 98–109)
CREAT SERPL-MCNC: 0.7 MG/DL (ref 0.6–1.3)
EOSINOPHIL # BLD AUTO: 0 10*3/UL (ref 0–0.9)
EOSINOPHIL NFR BLD AUTO: 0.8 %
ERYTHROCYTE [DISTWIDTH] IN BLOOD BY AUTOMATED COUNT: 18.7 % (ref 11.5–17)
GLUCOSE SERPL-MCNC: 83 MG/DL (ref 70–105)
HCT VFR BLD AUTO: 36 % (ref 37–47)
HCT VFR BLD CALC: 38 % (ref 38–51)
HEMOLYSIS INTERF INDEX SERPL-ACNC: 7
HGB BLD-MCNC: 11.5 G/DL (ref 12–16)
HGB BLD-MCNC: 12.9 G/DL (ref 12–17)
ICTERIC INTERF INDEX SERPL-ACNC: 1
LIPEMIC INTERF INDEX SERPL-ACNC: 6
LYMPHOCYTES # BLD AUTO: 1.5 10*3/UL (ref 0.6–4.6)
LYMPHOCYTES NFR BLD AUTO: 30.1 %
MANUAL DIFF? (OHS): NO
MCH RBC QN AUTO: 30.7 PG (ref 27–31)
MCHC RBC AUTO-ENTMCNC: 31.9 G/DL (ref 33–36)
MCV RBC AUTO: 96 FL (ref 80–94)
MONOCYTES # BLD AUTO: 0.9 10*3/UL (ref 0.1–1.3)
MONOCYTES NFR BLD AUTO: 18.4 %
NEUTROPHILS # BLD AUTO: 2.4 10*3/UL (ref 2.1–9.2)
NEUTROPHILS NFR BLD AUTO: 50.3 %
PLATELET # BLD AUTO: 152 10*3/UL (ref 130–400)
PMV BLD AUTO: 12.8 FL (ref 9.4–12.4)
POC IONIZED CALCIUM: 1.32 (ref 1.12–1.32)
POC TCO2: 25 (ref 24–29)
POTASSIUM BLD-SCNC: 3.7 MMOL/L (ref 3.5–4.9)
RBC # BLD AUTO: 3.75 10*6/UL (ref 4.2–5.4)
SODIUM BLD-SCNC: 140 MMOL/L (ref 138–146)
WBC # SPEC AUTO: 4.8 10*3/UL (ref 4.5–11.5)

## 2022-03-08 ENCOUNTER — HISTORICAL (OUTPATIENT)
Dept: INFUSION THERAPY | Facility: HOSPITAL | Age: 57
End: 2022-03-08

## 2022-03-10 ENCOUNTER — HISTORICAL (OUTPATIENT)
Dept: INFUSION THERAPY | Facility: HOSPITAL | Age: 57
End: 2022-03-10

## 2022-03-21 ENCOUNTER — HISTORICAL (OUTPATIENT)
Dept: ADMINISTRATIVE | Facility: HOSPITAL | Age: 57
End: 2022-03-21

## 2022-03-21 LAB
ABS NEUT (OLG): 2.48 (ref 2.1–9.2)
ALBUMIN SERPL-MCNC: 3.8 G/DL (ref 3.5–5)
ALBUMIN/GLOB SERPL: 1 {RATIO} (ref 1.1–2)
ALP SERPL-CCNC: 150 U/L (ref 40–150)
ALT SERPL-CCNC: 38 U/L (ref 0–55)
AST SERPL-CCNC: 50 U/L (ref 5–34)
BASOPHILS # BLD AUTO: 0 10*3/UL (ref 0–0.2)
BASOPHILS NFR BLD AUTO: 0.6 %
BILIRUB SERPL-MCNC: 0.9 MG/DL
BILIRUBIN DIRECT+TOT PNL SERPL-MCNC: 0.4 (ref 0–0.5)
BILIRUBIN DIRECT+TOT PNL SERPL-MCNC: 0.5 (ref 0–0.8)
BUN SERPL-MCNC: 13.4 MG/DL (ref 9.8–20.1)
CALCIUM SERPL-MCNC: 9.6 MG/DL (ref 8.7–10.5)
CHLORIDE SERPL-SCNC: 104 MMOL/L (ref 98–107)
CO2 SERPL-SCNC: 26 MMOL/L (ref 22–29)
CREAT SERPL-MCNC: 0.88 MG/DL (ref 0.55–1.02)
EOSINOPHIL # BLD AUTO: 0 10*3/UL (ref 0–0.9)
EOSINOPHIL NFR BLD AUTO: 1 %
ERYTHROCYTE [DISTWIDTH] IN BLOOD BY AUTOMATED COUNT: 17.7 % (ref 11.5–17)
GLOBULIN SER-MCNC: 3.9 G/DL (ref 2.4–3.5)
GLUCOSE SERPL-MCNC: 80 MG/DL (ref 74–100)
HCT VFR BLD AUTO: 37 % (ref 37–47)
HEMOLYSIS INTERF INDEX SERPL-ACNC: 11
HGB BLD-MCNC: 11.8 G/DL (ref 12–16)
ICTERIC INTERF INDEX SERPL-ACNC: 1
LIPEMIC INTERF INDEX SERPL-ACNC: 6
LYMPHOCYTES # BLD AUTO: 1.4 10*3/UL (ref 0.6–4.6)
LYMPHOCYTES NFR BLD AUTO: 29.2 %
MANUAL DIFF? (OHS): NO
MCH RBC QN AUTO: 31.2 PG (ref 27–31)
MCHC RBC AUTO-ENTMCNC: 31.9 G/DL (ref 33–36)
MCV RBC AUTO: 97.9 FL (ref 80–94)
MONOCYTES # BLD AUTO: 0.9 10*3/UL (ref 0.1–1.3)
MONOCYTES NFR BLD AUTO: 19 %
NEUTROPHILS # BLD AUTO: 2.5 10*3/UL (ref 2.1–9.2)
NEUTROPHILS NFR BLD AUTO: 50 %
PLATELET # BLD AUTO: 122 10*3/UL (ref 130–400)
PMV BLD AUTO: 11 FL (ref 9.4–12.4)
POTASSIUM SERPL-SCNC: 3.7 MMOL/L (ref 3.5–5.1)
PROT SERPL-MCNC: 7.7 G/DL (ref 6.4–8.3)
RBC # BLD AUTO: 3.78 10*6/UL (ref 4.2–5.4)
SODIUM SERPL-SCNC: 138 MMOL/L (ref 136–145)
WBC # SPEC AUTO: 5 10*3/UL (ref 4.5–11.5)

## 2022-03-22 ENCOUNTER — HISTORICAL (OUTPATIENT)
Dept: INFUSION THERAPY | Facility: HOSPITAL | Age: 57
End: 2022-03-22

## 2022-03-24 ENCOUNTER — HISTORICAL (OUTPATIENT)
Dept: INFUSION THERAPY | Facility: HOSPITAL | Age: 57
End: 2022-03-24

## 2022-04-04 ENCOUNTER — HISTORICAL (OUTPATIENT)
Dept: ADMINISTRATIVE | Facility: HOSPITAL | Age: 57
End: 2022-04-04

## 2022-04-04 LAB
ABS NEUT (OLG): 2.62 (ref 2.1–9.2)
ALBUMIN SERPL-MCNC: 3.5 G/DL (ref 3.5–5)
ALBUMIN/GLOB SERPL: 1 {RATIO} (ref 1.1–2)
ALP SERPL-CCNC: 147 U/L (ref 40–150)
ALT SERPL-CCNC: 27 U/L (ref 0–55)
AST SERPL-CCNC: 42 U/L (ref 5–34)
BASOPHILS # BLD AUTO: 0 10*3/UL (ref 0–0.2)
BASOPHILS NFR BLD AUTO: 0.5 %
BILIRUB SERPL-MCNC: 0.8 MG/DL
BILIRUBIN DIRECT+TOT PNL SERPL-MCNC: 0.3 (ref 0–0.5)
BILIRUBIN DIRECT+TOT PNL SERPL-MCNC: 0.5 (ref 0–0.8)
BUN SERPL-MCNC: 10.7 MG/DL (ref 9.8–20.1)
CALCIUM SERPL-MCNC: 9.6 MG/DL (ref 8.7–10.5)
CEA SERPL-MCNC: 4.28 NG/ML (ref 0–3)
CHLORIDE SERPL-SCNC: 109 MMOL/L (ref 98–107)
CO2 SERPL-SCNC: 22 MMOL/L (ref 22–29)
CREAT SERPL-MCNC: 0.92 MG/DL (ref 0.55–1.02)
EOSINOPHIL # BLD AUTO: 0.1 10*3/UL (ref 0–0.9)
EOSINOPHIL NFR BLD AUTO: 1.6 %
ERYTHROCYTE [DISTWIDTH] IN BLOOD BY AUTOMATED COUNT: 16.1 % (ref 11.5–17)
GLOBULIN SER-MCNC: 3.5 G/DL (ref 2.4–3.5)
GLUCOSE SERPL-MCNC: 129 MG/DL (ref 74–100)
HCT VFR BLD AUTO: 39.3 % (ref 37–47)
HEMOLYSIS INTERF INDEX SERPL-ACNC: 3
HEMOLYSIS INTERF INDEX SERPL-ACNC: 3
HGB BLD-MCNC: 12.6 G/DL (ref 12–16)
ICTERIC INTERF INDEX SERPL-ACNC: 1
LIPEMIC INTERF INDEX SERPL-ACNC: 6
LYMPHOCYTES # BLD AUTO: 1.2 10*3/UL (ref 0.6–4.6)
LYMPHOCYTES NFR BLD AUTO: 26.4 %
MAGNESIUM SERPL-MCNC: 2.1 MG/DL (ref 1.6–2.6)
MANUAL DIFF? (OHS): NO
MCH RBC QN AUTO: 31.3 PG (ref 27–31)
MCHC RBC AUTO-ENTMCNC: 32.1 G/DL (ref 33–36)
MCV RBC AUTO: 97.8 FL (ref 80–94)
MONOCYTES # BLD AUTO: 0.6 10*3/UL (ref 0.1–1.3)
MONOCYTES NFR BLD AUTO: 12.4 %
NEUTROPHILS # BLD AUTO: 2.6 10*3/UL (ref 2.1–9.2)
NEUTROPHILS NFR BLD AUTO: 58.9 %
PLATELET # BLD AUTO: 84 10*3/UL (ref 130–400)
PMV BLD AUTO: 9.8 FL (ref 9.4–12.4)
POTASSIUM SERPL-SCNC: 3.7 MMOL/L (ref 3.5–5.1)
PROT SERPL-MCNC: 7 G/DL (ref 6.4–8.3)
RBC # BLD AUTO: 4.02 10*6/UL (ref 4.2–5.4)
SODIUM SERPL-SCNC: 142 MMOL/L (ref 136–145)
WBC # SPEC AUTO: 4.4 10*3/UL (ref 4.5–11.5)

## 2022-04-05 ENCOUNTER — HISTORICAL (OUTPATIENT)
Dept: INFUSION THERAPY | Facility: HOSPITAL | Age: 57
End: 2022-04-05

## 2022-04-07 ENCOUNTER — HISTORICAL (OUTPATIENT)
Dept: INFUSION THERAPY | Facility: HOSPITAL | Age: 57
End: 2022-04-07

## 2022-04-10 ENCOUNTER — HISTORICAL (OUTPATIENT)
Dept: ADMINISTRATIVE | Facility: HOSPITAL | Age: 57
End: 2022-04-10
Payer: COMMERCIAL

## 2022-04-11 ENCOUNTER — HISTORICAL (OUTPATIENT)
Dept: ADMINISTRATIVE | Facility: HOSPITAL | Age: 57
End: 2022-04-11
Payer: COMMERCIAL

## 2022-04-28 VITALS
DIASTOLIC BLOOD PRESSURE: 86 MMHG | SYSTOLIC BLOOD PRESSURE: 137 MMHG | OXYGEN SATURATION: 100 % | WEIGHT: 154.56 LBS | HEIGHT: 64 IN | BODY MASS INDEX: 26.39 KG/M2

## 2022-04-28 VITALS
DIASTOLIC BLOOD PRESSURE: 86 MMHG | SYSTOLIC BLOOD PRESSURE: 137 MMHG | HEIGHT: 64 IN | WEIGHT: 154.56 LBS | OXYGEN SATURATION: 100 % | BODY MASS INDEX: 26.39 KG/M2

## 2022-04-29 NOTE — OP NOTE
Agustin Serra MD      Patient:   Ami Smith            MRN: [id number]            FIN: [id number]               Age:   56 years     Sex:  Female     :  1965   Associated Diagnoses:   None   Author:   Agustin Serra MD      Surgeon: Agustin Serra MD    Assistant: None    Preoperative Diagnosis:   Colon cancer    Postoperative Diagnosis:  Same    Procedure: Left subclavian Mediport insertion with fluoroscopic guidance    Anesthesia: Local/MAC    Estimated Blood Loss: Less than 15 cc    Intra-operative Findings:  8Fr power port placed,  Final fluoroscopy revealed the tip of the catheter within the superior vena cava.  The port flushed and aspirated freely.    Procedure in Detail:  After informed consent was obtained patient was brought to the operating room and placed in the supine position.  Sedation was administered by anesthesia.  The upper chest and neck were prepped and draped in sterile fashion.  After infiltration with local anesthesia, the left subclavian vein was cannulated with a large-bore needle and a guidewire was placed under fluoroscopic guidance into the superior vena cava.  An incision was made below the wire insertion site and a pocket was created for the port over the pectoralis fascia.  A PowerPort was soaked in antimicrobial solution, it was assembled and flushed.  It was placed in the pocket and the catheter was tunneled to the wire insertion site without difficulty.  The catheter was cut to size using fluoroscopic guidance.  An introducer dilator was placed over the guidewire under fluoroscopic vision and the catheter was threaded through the peel-away introducer without difficulty.  Final fluoroscopy revealed the tip of the catheter in good position.  The port flushed and aspirated freely, a final heparin solution was instilled.  The port pocket was irrigated with antimicrobial solution, meticulous hemostasis was achieved, it was closed in multiple layers with absorbable  suture.  Sterile dressings were applied.  The patient tolerated the procedure well.

## 2022-04-30 DIAGNOSIS — C18.2 CARCINOMA OF ASCENDING COLON: Primary | ICD-10-CM

## 2022-05-03 NOTE — HISTORICAL [facility] [employer]
This is a historical note converted from [employer]. Formatting and pictures may have been removed.  Please reference [employer] for original formatting and attached multimedia. The patient was seen and examined there are no changes?in physical exam

## 2022-05-04 PROBLEM — Z95.828 PORT-A-CATH IN PLACE: Status: ACTIVE | Noted: 2021-10-29

## 2022-05-11 ENCOUNTER — TELEPHONE (OUTPATIENT)
Dept: HEMATOLOGY/ONCOLOGY | Facility: CLINIC | Age: 57
End: 2022-05-11
Payer: COMMERCIAL

## 2022-05-11 DIAGNOSIS — C18.0 ADENOCARCINOMA OF CECUM: ICD-10-CM

## 2022-05-11 DIAGNOSIS — C18.2 MALIGNANT NEOPLASM OF ASCENDING COLON: Primary | ICD-10-CM

## 2022-05-11 RX ORDER — GABAPENTIN 300 MG/1
300 CAPSULE ORAL NIGHTLY
Qty: 30 CAPSULE | Refills: 3 | Status: SHIPPED | OUTPATIENT
Start: 2022-05-11 | End: 2022-10-19

## 2022-05-11 NOTE — TELEPHONE ENCOUNTER
Let her know that the neuropathy typically gets worse after completion of chemotherapy. We can try gabapentin 300mg q hs

## 2022-05-18 ENCOUNTER — OFFICE VISIT (OUTPATIENT)
Dept: SURGICAL ONCOLOGY | Facility: CLINIC | Age: 57
End: 2022-05-18
Payer: COMMERCIAL

## 2022-05-18 VITALS
DIASTOLIC BLOOD PRESSURE: 84 MMHG | WEIGHT: 162 LBS | SYSTOLIC BLOOD PRESSURE: 176 MMHG | HEART RATE: 88 BPM | BODY MASS INDEX: 28 KG/M2

## 2022-05-18 DIAGNOSIS — L98.9 SKIN LESION: Primary | ICD-10-CM

## 2022-05-18 DIAGNOSIS — C18.2 MALIGNANT NEOPLASM OF ASCENDING COLON: ICD-10-CM

## 2022-05-18 PROCEDURE — 36590 REMOVAL TUNNELED CV CATH: CPT | Mod: S$GLB,,, | Performed by: SURGERY

## 2022-05-18 PROCEDURE — 3077F SYST BP >= 140 MM HG: CPT | Mod: CPTII,S$GLB,, | Performed by: SURGERY

## 2022-05-18 PROCEDURE — 11403 PR EXC SKIN BENIG 2.1-3 CM TRUNK,ARM,LEG: ICD-10-PCS | Mod: 51,LT,S$GLB, | Performed by: SURGERY

## 2022-05-18 PROCEDURE — 11403 EXC TR-EXT B9+MARG 2.1-3CM: CPT | Mod: 51,LT,S$GLB, | Performed by: SURGERY

## 2022-05-18 PROCEDURE — 3044F PR MOST RECENT HEMOGLOBIN A1C LEVEL <7.0%: ICD-10-PCS | Mod: CPTII,S$GLB,, | Performed by: SURGERY

## 2022-05-18 PROCEDURE — 3008F BODY MASS INDEX DOCD: CPT | Mod: CPTII,S$GLB,, | Performed by: SURGERY

## 2022-05-18 PROCEDURE — 3044F HG A1C LEVEL LT 7.0%: CPT | Mod: CPTII,S$GLB,, | Performed by: SURGERY

## 2022-05-18 PROCEDURE — 3079F PR MOST RECENT DIASTOLIC BLOOD PRESSURE 80-89 MM HG: ICD-10-PCS | Mod: CPTII,S$GLB,, | Performed by: SURGERY

## 2022-05-18 PROCEDURE — 99212 PR OFFICE/OUTPT VISIT, EST, LEVL II, 10-19 MIN: ICD-10-PCS | Mod: 25,S$GLB,, | Performed by: SURGERY

## 2022-05-18 PROCEDURE — 3077F PR MOST RECENT SYSTOLIC BLOOD PRESSURE >= 140 MM HG: ICD-10-PCS | Mod: CPTII,S$GLB,, | Performed by: SURGERY

## 2022-05-18 PROCEDURE — 3008F PR BODY MASS INDEX (BMI) DOCUMENTED: ICD-10-PCS | Mod: CPTII,S$GLB,, | Performed by: SURGERY

## 2022-05-18 PROCEDURE — 99212 OFFICE O/P EST SF 10 MIN: CPT | Mod: 25,S$GLB,, | Performed by: SURGERY

## 2022-05-18 PROCEDURE — 3079F DIAST BP 80-89 MM HG: CPT | Mod: CPTII,S$GLB,, | Performed by: SURGERY

## 2022-05-18 PROCEDURE — 36590 PR REMOVAL TUNNELED CV CATH W SUBQ PORT OR PUMP: ICD-10-PCS | Mod: S$GLB,,, | Performed by: SURGERY

## 2022-05-18 PROCEDURE — 99999 PR PBB SHADOW E&M-EST. PATIENT-LVL III: CPT | Mod: PBBFAC,,, | Performed by: SURGERY

## 2022-05-18 PROCEDURE — 99999 PR PBB SHADOW E&M-EST. PATIENT-LVL III: ICD-10-PCS | Mod: PBBFAC,,, | Performed by: SURGERY

## 2022-05-23 LAB
ESTROGEN SERPL-MCNC: NORMAL PG/ML
INSULIN SERPL-ACNC: NORMAL U[IU]/ML
LAB AP CLINICAL INFORMATION: NORMAL
LAB AP GROSS DESCRIPTION: NORMAL
LAB AP REPORT FOOTNOTES: NORMAL
T3RU NFR SERPL: NORMAL %

## 2022-06-21 ENCOUNTER — TELEPHONE (OUTPATIENT)
Dept: HEMATOLOGY/ONCOLOGY | Facility: CLINIC | Age: 57
End: 2022-06-21
Payer: COMMERCIAL

## 2022-06-22 ENCOUNTER — DOCUMENTATION ONLY (OUTPATIENT)
Dept: HEMATOLOGY/ONCOLOGY | Facility: CLINIC | Age: 57
End: 2022-06-22
Payer: COMMERCIAL

## 2022-06-22 NOTE — PROGRESS NOTES
Patient c/o bilateral LE swelling x 1 week. I instructed patient to contact PCP for evaluation. She voiced understanding.

## 2022-08-01 ENCOUNTER — OFFICE VISIT (OUTPATIENT)
Dept: URGENT CARE | Facility: CLINIC | Age: 57
End: 2022-08-01
Payer: COMMERCIAL

## 2022-08-01 VITALS
DIASTOLIC BLOOD PRESSURE: 76 MMHG | SYSTOLIC BLOOD PRESSURE: 150 MMHG | BODY MASS INDEX: 30.22 KG/M2 | WEIGHT: 177 LBS | OXYGEN SATURATION: 99 % | HEART RATE: 68 BPM | TEMPERATURE: 99 F | HEIGHT: 64 IN | RESPIRATION RATE: 20 BRPM

## 2022-08-01 DIAGNOSIS — B02.9 HERPES ZOSTER WITHOUT COMPLICATION: Primary | ICD-10-CM

## 2022-08-01 PROCEDURE — 99214 OFFICE O/P EST MOD 30 MIN: CPT | Mod: S$PBB,,, | Performed by: FAMILY MEDICINE

## 2022-08-01 PROCEDURE — 99215 OFFICE O/P EST HI 40 MIN: CPT | Mod: PBBFAC | Performed by: FAMILY MEDICINE

## 2022-08-01 PROCEDURE — 99214 PR OFFICE/OUTPT VISIT, EST, LEVL IV, 30-39 MIN: ICD-10-PCS | Mod: S$PBB,,, | Performed by: FAMILY MEDICINE

## 2022-08-01 RX ORDER — MECLIZINE HCL 25MG 25 MG/1
25 TABLET, CHEWABLE ORAL 3 TIMES DAILY PRN
COMMUNITY

## 2022-08-01 RX ORDER — FERROUS SULFATE 324(65)MG
1 TABLET, DELAYED RELEASE (ENTERIC COATED) ORAL
COMMUNITY
Start: 2022-03-07 | End: 2022-10-19

## 2022-08-01 RX ORDER — VALACYCLOVIR HYDROCHLORIDE 1 G/1
1000 TABLET, FILM COATED ORAL 3 TIMES DAILY
Qty: 21 TABLET | Refills: 0 | Status: SHIPPED | OUTPATIENT
Start: 2022-08-01 | End: 2022-10-19

## 2022-08-01 RX ORDER — PREDNISONE 20 MG/1
20 TABLET ORAL DAILY
Qty: 5 TABLET | Refills: 0 | Status: SHIPPED | OUTPATIENT
Start: 2022-08-01 | End: 2022-08-06

## 2022-08-01 NOTE — LETTER
August 1, 2022      [facility]  [address] [address]  Phone: [phone]       Patient: Ami Smith   YOB: 1965  Date of Visit: 08/01/2022    To Whom It May Concern:    PIERRE Smith  was at [facility] on 08/01/2022. The patient may return to work/school on 8/3/2022 with no restrictions. If you have any questions or concerns, or if I can be of further assistance, please do not hesitate to contact me.    Sincerely,    Arnaldo Lindsey MD

## 2022-08-02 NOTE — PROGRESS NOTES
"Subjective:       Patient ID: Ami Smith is a 57 y.o. female.    Chief Complaint: Rash (Blister like on abd to back )      HPI   57-year-old female with blistering rash on her right abdomen and back.  Started approximately 2 days ago.  Rash started itching and is now painful.  Patient denies sick contacts.  She has tried over-the-counter medication with little improvement.  Review of Systems   Integumentary:         As above         Objective:       Vital Signs  Temp: 98.8 °F (37.1 °C)  Pulse: 68  Resp: 20  SpO2: 99 %  BP: (!) 150/76  Pain Score:   5  Height and Weight  Height: 5' 4" (162.6 cm)  Weight: 80.3 kg (177 lb)  BSA (Calculated - sq m): 1.9 sq meters  BMI (Calculated): 30.4  Weight in (lb) to have BMI = 25: 145.3]  Physical Exam  Vitals reviewed.   Constitutional:       Appearance: Normal appearance.   HENT:      Head: Normocephalic and atraumatic.   Skin:     Comments: Vesicular rash along right T6/T7 dermatome.  Extends from right flank to right anterior abdomen.   Neurological:      General: No focal deficit present.      Mental Status: She is alert and oriented to person, place, and time.   Psychiatric:         Mood and Affect: Mood and affect normal.         Speech: Speech normal.         Behavior: Behavior normal. Behavior is cooperative.         Thought Content: Thought content does not include homicidal or suicidal ideation.         Assessment:       Problem List Items Addressed This Visit    None     Visit Diagnoses     Herpes zoster without complication    -  Primary    Relevant Medications    valACYclovir (VALTREX) 1000 MG tablet    predniSONE (DELTASONE) 20 MG tablet          Plan:           Contact precautions until lesions have crusted  ER precautions  Follow-up with PCP  "

## 2022-09-19 ENCOUNTER — DOCUMENTATION ONLY (OUTPATIENT)
Dept: SURGICAL ONCOLOGY | Facility: CLINIC | Age: 57
End: 2022-09-19
Payer: COMMERCIAL

## 2022-09-19 NOTE — PROGRESS NOTES
* Final Report *      Chief Complaint     6 MONTH F/U- S/P L/R RIGHT COLON RESECTION    History of Present Illness     56-year-old female underwent laparoscopic/robotic right hemicolectomy in September 2021 with final pathology revealing moderately differentiated adenocarcinoma of the cecum, 6 cm in maximum diameter invading through the muscularis propria into pericolorectal tissue, 2 out of 36 resected lymph nodes were positive for metastatic adenocarcinoma.  She has been undergoing adjuvant chemotherapy with Dr. Pompa and is tolerating this well.  She reports satisfactory bowel function, no frequency or constipation.    Review of Systems     14 point review of systems was performed and was negative except for those pertinent positives and negatives mentioned in the history of present illness    Physical Exam   Vitals & Measurements   HR: 97(Peripheral)  BP: 205/87   HT: 162.00 cm  WT: 69.600 kg  BMI: 26.52     General: Alert and oriented, No acute distress.  Eye: Pupils are equal, round and reactive to light, Extraocular movements are intact, Normal conjunctiva, Vision unchanged.  HENT: Normal hearing, Oral mucosa is moist, No pharyngeal erythema, Ear canals patent, No sinus tenderness.  Neck: Supple, Non-tender, No carotid bruit, No jugular venous distention, No lymphadenopathy, No thyromegaly.  Respiratory: Lungs are clear to auscultation, Respirations are non-labored, Breath sounds are equal, Symmetrical chest wall expansion, No chest wall tenderness.  Cardiovascular: Normal rate, Regular rhythm, No murmur, No gallop, Good pulses equal in all extremities, Normal peripheral perfusion, No edema.  Genitourinary: No costovertebral angle tenderness, No inguinal tenderness, No urethral discharge, No lesions.  Lymphatics: No lymphadenopathy neck, axilla, groin.  Musculoskeletal: Normal range of motion, Normal strength, No tenderness, No swelling, No deformity, Normal gait.  Integumentary: Warm, Pink, Intact, Moist,  No pallor, No rash.  Cognition and Speech: Oriented, Speech clear and coherent, Functional cognition intact.   Abdomen: Soft nontender, nondistended, no palpable masses    Assessment/Plan       1. Carcinoma of ascending colon C18.2       Patient tolerating adjuvant chemotherapy very well   Satisfactory bowel function   Follow-up/surveillance colonoscopy at gastroenterologist discretion   Follow-up/surveillance imaging per medical oncology protocol   Return to clinic in 6 months        Ordered:      Office/Outpatient Visit Level 4 Established [id number] PC, Carcinoma of ascending colon, [facility], 03/29/22 10:08:00 CDT      Orders:     Nurse Visit, 09/20/22 9:05:00 CDT, Order for future visit    Referrals       Nurse Visit, 09/20/22 9:05:00 CDT, Order for future visit     Problem List/Past Medical History     Ongoing   2019-nCoV    Abnormal cervical Papanicolaou smear    Allergic rhinitis    Carcinoma of ascending colon    GERD - Gastro-esophageal reflux disease    Hypertension    Iron deficiency anemia    Left trigger finger    Obesity    Trigger finger of right hand    Vertigo    Historical   Iron deficiency anemia    Procedure/Surgical History   Catheter Insertion Mediport (None) (10/29/2021)  Fluoroscopic guidance for central venous access device placement, replacement (catheter only or complete), or removal (includes fluoroscopic guidance for vascular access and catheter manipulation, any necessary contrast injections through access site or c (10/29/2021)  Fluoroscopy of Superior Vena Cava using Low Osmolar Contrast, Guidance (10/29/2021)  Insertion of Infusion Device into Superior Vena Cava, Percutaneous Approach (10/29/2021)  Insertion of Totally Implantable Vascular Access Device into Chest Subcutaneous Tissue and Fascia, Open Approach (10/29/2021)  Insertion of tunneled centrally inserted central venous access device, with subcutaneous port; age 5 years or older (10/29/2021)  Insertion of tunneled  centrally inserted central venous access device, with subcutaneous port; age 5 years or older (10/29/2021)  Colon Resection Ascending Robotic Assist (Right) (09/09/2021)  Laparoscopy, surgical; colectomy, partial, with removal of terminal ileum with ileocolostomy (09/09/2021)  Laparoscopy, surgical; colectomy, partial, with removal of terminal ileum with ileocolostomy (09/09/2021)  Omental flap, intra-abdominal (List separately in addition to code for primary procedure) (09/09/2021)  Omental flap, intra-abdominal (List separately in addition to code for primary procedure) (09/09/2021)  Resection of Right Large Intestine, Percutaneous Endoscopic Approach (09/09/2021)  Robotic Assisted Procedure of Trunk Region, Percutaneous Endoscopic Approach (09/09/2021)  Transversus abdominis plane (TAP) block (abdominal plane block, rectus sheath block) bilateral; by injections (includes imaging guidance, when performed) (09/09/2021)  Transversus abdominis plane (TAP) block (abdominal plane block, rectus sheath block) bilateral; by injections (includes imaging guidance, when performed) (09/09/2021)  Lip Surgery (1973)    Medications     acetaminophen-hydrocodone 325 mg-5 mg oral tablet, 1 tab(s), Oral, q6hr    amlodipine 5 mg oral tablet, 5 mg= 1 tab(s), Oral, Daily    Celebrate Multivitamin oral capsule, 1 cap(s), Oral, Daily    ergocalciferol 50,000 intl units (1.25 mg) oral capsule, 68527 IntUnit= 1 cap(s), Oral, qWeek    ibuprofen 50 mg oral tablet, chewable, 50 mg= 1 tab(s), Chewed, q6hr, PRN    Magic Mouthwash Combination, See Instructions, 1 refills    meclizine 25 mg oral tablet, 25 mg= 1 tab(s), Oral, BID    meloxicam 7.5 mg oral tablet, 7.5 mg= 1 tab(s), Oral, Daily, PRN, 1 refills    MiraLax oral powder for reconstitution, 17 gm, Oral, Daily    Pantoprazole 40 mg ORAL EC-Tablet, 40 mg= 1 tab(s), Oral, Daily    prochlorperazine 5 mg oral tablet, 5 mg= 1 tab(s), Oral, q6hr, PRN    Vitamin C 250 mg oral tablet, 250 mg= 1  tab(s), Oral, Daily    Zofran 8 mg oral tablet, See Instructions, PRN    Allergies     penicillin (Hives)    Social History       Abuse/Neglect      No, 03/24/2022      Alcohol      Past, 10/27/2021      Employment/School      Employed, Highest education level: Some college., 02/27/2017      Exercise      Exercise frequency: Daily. Exercise type: Walking., 10/25/2018      Home/Environment      Lives with Alone. Living situation: Home/Independent. Home equipment: BP machine. Alcohol abuse in household: No. Substance abuse in household: No. Smoker in household: No. Feels unsafe at home: No. Safe place to go: Yes. Family/Friends available for support: Yes. Concern for family members at home: No., 02/27/2017      Nutrition/Health      Regular, Wants to lose weight: Yes., 02/27/2017      Sexual      Spiritual/Cultural      Nondenominational, Yes, 09/08/2021      Substance Use      Never, 10/27/2021      Tobacco      Never (less than 100 in lifetime), N/A, 03/24/2022    Family History     Acute myocardial infarction.: Mother.    Anemia: Sister.    Crohn's disease.: Sister.    Diabetes mellitus type 2: Mother and Brother.    Hypertension.: Mother.    Father: History is negative    Immunizations       Vaccine    Date    Status      influenza virus vaccine, inactivated 01/20/2022 Given   COVID-19 MRNA, LNP-S, PF- Pfizer 05/02/2021 Given   COVID-19 MRNA, LNP-S, PF- Pfizer 04/11/2021 Given   influenza virus vaccine, inactivated 10/10/2019 Given   tetanus/diphtheria/pertussis, acel(Tdap) 02/27/2017 Given                      Result type:  Surgery Office/Clinic Note     Result date:  March 29, 2022 10:08 CDT     Result status:  Auth (Verified)     Result title:  Office Visit Note     Performed by:  Agustin Serra MD on March 29, 2022 10:10 CDT     Verified by:  Agustin Serra MD on March 29, 2022 10:10 CDT     Encounter info:  [id number], [facility], Clinic Visit, 3/29/2022 - 3/29/2022

## 2022-09-26 DIAGNOSIS — C18.2 MALIGNANT NEOPLASM OF ASCENDING COLON: Primary | ICD-10-CM

## 2022-10-13 ENCOUNTER — HOSPITAL ENCOUNTER (OUTPATIENT)
Dept: RADIOLOGY | Facility: HOSPITAL | Age: 57
Discharge: HOME | End: 2022-10-13
Attending: NURSE PRACTITIONER
Payer: COMMERCIAL

## 2022-10-13 DIAGNOSIS — C18.2 MALIGNANT NEOPLASM OF ASCENDING COLON: ICD-10-CM

## 2022-10-13 LAB
CREAT SERPL-MCNC: 0.9 MG/DL (ref 0.5–1.4)
SAMPLE: NORMAL

## 2022-10-13 PROCEDURE — 74177 CT ABD & PELVIS W/CONTRAST: CPT | Mod: TC

## 2022-10-13 PROCEDURE — 71260 CT THORAX DX C+: CPT | Mod: TC

## 2022-10-13 PROCEDURE — 25500020 PHARM REV CODE 255: Performed by: NURSE PRACTITIONER

## 2022-10-13 RX ADMIN — IOPAMIDOL 100 ML: 755 INJECTION, SOLUTION INTRAVENOUS at 11:10

## 2022-10-13 RX ADMIN — DIATRIZOATE MEGLUMINE AND DIATRIZOATE SODIUM 30 ML: 660; 100 LIQUID ORAL; RECTAL at 11:10

## 2022-10-18 PROBLEM — C18.2 MALIGNANT NEOPLASM OF ASCENDING COLON: Status: ACTIVE | Noted: 2021-07-20

## 2022-10-18 NOTE — ASSESSMENT & PLAN NOTE
Patient is now 1 year out from her surgical resection --complete clinical remission  Return to clinic in 3-6 months with CBC, CMP, CEA   Repeat colonoscopy--Dr. Hernaedz was done 6/2022 (repeat in 3 year 6/2025)  Repeat CT scan October 2023 unless otherwise clinically indicated  F/u with primary care  Work evaluation -  Neuropathy and  Neurology evaluation with functional evaluation--She states she can stand for that long    And will let neurology and primary care discuss her disability issues- and HR at her work

## 2022-10-18 NOTE — PROGRESS NOTES
Heme/Onc Progress Note    PATIENT: Ami Smith  MRN: [id number]  DATE: 10/19/2022  Chief Complaint: Follow-up (C/o leg pain and swelling)    Current Treatment: observation    Oncology History Overview Note   Diagnosis:   Stage IIIb (pT3N1b Adenocarcinoma of cecum). Diagnosed 7/20/21        Treatment History:   9/9/21:Right hemicolectomy   11/1/21: Adjuvant FOLFOX (No 5FU bolus) --4/522     Tumor Site: Cecum   Histologic Type: Adenocarcinoma   Histologic Grade: G2: Moderately differentiated   Tumor Size: 6 Centimeters (cm)   Tumor Extension: Tumor invades through the muscularis propria into pericolorectal tissue   Macroscopic Tumor Perforation: Not identified   Lymphovascular Invasion: Indeterminate.   Perineural Invasion: Not identified   Treatment Effect: No known presurgical therapy   Margins   Margins: All margins are uninvolved by invasive carcinoma, high grade dysplasia / intramucosal carcinoma, and low grade dysplasia   Margins Examined: Proximal; Distal; Radial (circumferential) or Mesenteric   Distance of Invasive Carcinoma from Closest Margin: 5 cm   Closest Margin: Radial (circumferential) or Mesenteric   Distance of Tumor from Radial (circumferential) Margin: 5 cm   Lymph Nodes   Number of Lymph Nodes Involved: 2   Number of Lymph Nodes Examined: 36   Tumor Deposits: Not identified   Pathologic Stage Classification   Primary Tumor: pT3   Regional Lymph Nodes: pN1b   Immunohistochemical stains for MLH1, MSH2, MSH6, and PMS2 are intact and normal. There is no evidence of DNA mismatch repair deficiency. Indicating the tumor is microsatellite stable, and HNP CC is very unlikely   Clinical History:   This patient presented to her PCP in July 2021 with a 3 week history of rectal bleeding and 15 lb weight loss. She was found to have iron deficiency anemia and was referred for colonoscopy.   7/20/21: Colonoscopy showed a 4 cm infiltrative, ulcerative fungating mass in the cecum and proximal ascending  colon. Pathology revealed fragments of adenocarcinoma, moderately differentiated. CT abd/pel was performed showing thickening of the cecal wall with local adenopathy. Pre op CEA 3.2   7/29/2021: CT scan abdomen pelvis cecal wall thickening with adjacent fatty infiltration adenopathy consistent with history of cecal neoplasm, diffuse fatty liver, angiomyolipoma of the right kidney, left renal cyst   8/19/21: CT chest-unremarkable.   She underwent right hemicolectomy and is referred to us for further treatment recommendations.      Malignant neoplasm of ascending colon   9/14/2021 Cancer Staged    Staging form: Colon and Rectum, AJCC 8th Edition  - Pathologic stage from 9/14/2021: Stage IIIB (pT3, pN1b, cM0)       11/1/2021 - 4/7/2022 Chemotherapy    Treatment Summary   Plan Name: OP mFOLFOX 6 Q2W  Treatment Goal: Control  Status: Inactive  Start Date: 11/1/2021  End Date: 4/7/2022  Provider: Mckinley Pompa MD  Chemotherapy: dexAMETHasone (DECADRON) 4 MG Tab, 8 mg, Oral, Daily, 1 of 1 cycle, Start date: --, End date: --  leucovorin calcium 400 mg/m2 in dextrose 5 % 250 mL infusion, 400 mg/m2, Intravenous, Clinic/HOD 1 time, 12 of 12 cycles  oxaliplatin (ELOXATIN) 85 mg/m2 in dextrose 5 % 500 mL chemo infusion, 85 mg/m2, Intravenous, Clinic/HOD 1 time, 11 of 11 cycles  Dose modification: 65 mg/m2 (Cycle 9)       3/4/2022 Initial Diagnosis    Malignant neoplasm of ascending colon         10/19/2022  Patient was last seen in April of 2022.  The plan was to remove her port refer her back to GI and come back in 4 weeks with scans.  Her laboratory review at that time so she had a CEA of 4.2.  A platelet count of 84 white count of 4 left over from chemotherapy    Patient reports weight gain, she still has difficulty standing long periods of time with her neuropathy in her feet.  She has minimal numbness in her hands, she has no  strength changes on exam.    She is able to get up from a sitting to standing position  without problems and ambulate across the room without difficulty get up onto the table.    She reports she is taking the gabapentin but she still has worsening burning at night.  And worse when she stands on her feet for longer periods of time    She denies any change in bowel habits, she is up-to-date on her colonoscopy as it was in June    Past Medical History:   Diagnosis Date    Colon cancer     GERD (gastroesophageal reflux disease)     Hypertension     MAYNOR (iron deficiency anemia)     Port-A-Cath in place 10/29/2021    Dr. Serra        Current Outpatient Medications:     amLODIPine (NORVASC) 5 MG tablet, Take 5 mg by mouth once daily., Disp: , Rfl:     ergocalciferol (ERGOCALCIFEROL) 50,000 unit Cap, Take 50,000 Int'l Units by mouth., Disp: , Rfl:     gabapentin (NEURONTIN) 300 MG capsule, Take by mouth. Takes 1 cap in AM 2 caps PM, Disp: , Rfl:     IBUPROFEN ORAL, Take 50 mg by mouth., Disp: , Rfl:     meclizine (ANTIVERT) 25 MG tablet, Take 25 mg by mouth 3 (three) times daily as needed., Disp: , Rfl:     pantoprazole (PROTONIX) 40 MG tablet, Take 40 mg by mouth., Disp: , Rfl:     HYDROcodone-acetaminophen (NORCO) 5-325 mg per tablet, Take by mouth., Disp: , Rfl:     polyethylene glycol (GLYCOLAX) 17 gram/dose powder, Take 17 g by mouth., Disp: , Rfl:      Review of Systems:   Pertinent positives and negatives included in the HPI. Otherwise a complete review of systems is negative.      Objective:     Vitals:    10/19/22 1125   BP: (!) 172/95   Pulse: 76   Temp: 98.1 °F (36.7 °C)         Physical Exam  Vitals reviewed.   Constitutional:       Appearance: Normal appearance. She is not ill-appearing, toxic-appearing or diaphoretic.   HENT:      Head: Normocephalic and atraumatic.      Mouth/Throat:      Mouth: Mucous membranes are moist.      Pharynx: Oropharynx is clear.   Eyes:      Extraocular Movements: Extraocular movements intact.      Conjunctiva/sclera: Conjunctivae normal.      Pupils: Pupils are  equal, round, and reactive to light.   Cardiovascular:      Rate and Rhythm: Normal rate and regular rhythm.   Pulmonary:      Effort: Pulmonary effort is normal.      Breath sounds: Normal breath sounds.   Abdominal:      General: Abdomen is flat. Bowel sounds are normal.      Palpations: Abdomen is soft.   Musculoskeletal:         General: No swelling.      Right lower leg: No edema.      Left lower leg: No edema.      Comments: This patient was able to ambulate into the clinic independently, she was able to stand from a seated position independently, ambulate across the room get up onto the table pivot and sit down without any loss of balance or dizziness.    She was able to rise from a lying to sitting position with good truncal mobility, she has had been down to  her purse without difficulty and ambulated out of the clinic.    She has good  strength bilaterally and has no focal leg weakness.     Skin:     General: Skin is warm.      Capillary Refill: Capillary refill takes less than 2 seconds.   Neurological:      Mental Status: She is alert and oriented to person, place, and time.      Cranial Nerves: No cranial nerve deficit.      Motor: No weakness.      Gait: Gait normal.   Psychiatric:         Mood and Affect: Mood normal.         Behavior: Behavior normal.         Thought Content: Thought content normal.         Judgment: Judgment normal.       ECOG SCORE    0 - Fully active-able to carry on all pre-disease performance without restriction        Hospital Outpatient Visit on 10/13/2022   Component Date Value Ref Range Status    POC Creatinine 10/13/2022 0.9  0.5 - 1.4 mg/dL Final    Sample 10/13/2022 unknown   Final   Lab Visit on 10/13/2022   Component Date Value Ref Range Status    Sodium Level 10/13/2022 138  136 - 145 mmol/L Final    Potassium Level 10/13/2022 4.1  3.5 - 5.1 mmol/L Final    Chloride 10/13/2022 104  98 - 107 mmol/L Final    Carbon Dioxide 10/13/2022 22  22 - 29 mmol/L Final     Glucose Level 10/13/2022 101 (H)  74 - 100 mg/dL Final    Blood Urea Nitrogen 10/13/2022 13.2  9.8 - 20.1 mg/dL Final    Creatinine 10/13/2022 0.99  0.55 - 1.02 mg/dL Final    Calcium Level Total 10/13/2022 9.9  8.4 - 10.2 mg/dL Final    Protein Total 10/13/2022 7.9  6.4 - 8.3 gm/dL Final    Albumin Level 10/13/2022 4.3  3.5 - 5.0 gm/dL Final    Globulin 10/13/2022 3.6 (H)  2.4 - 3.5 gm/dL Final    Albumin/Globulin Ratio 10/13/2022 1.2  1.1 - 2.0 ratio Final    Bilirubin Total 10/13/2022 0.7  <=1.5 mg/dL Final    Alkaline Phosphatase 10/13/2022 144  40 - 150 unit/L Final    Alanine Aminotransferase 10/13/2022 23  0 - 55 unit/L Final    Aspartate Aminotransferase 10/13/2022 33  5 - 34 unit/L Final    eGFR 10/13/2022 >60  mls/min/1.73/m2 Final    Carcinoembryonic Antigen 10/13/2022 2.66  0.00 - 3.00 ng/mL Final    WBC 10/13/2022 6.9  4.5 - 11.5 x10(3)/mcL Final    RBC 10/13/2022 4.59  4.20 - 5.40 x10(6)/mcL Final    Hgb 10/13/2022 12.7  12.0 - 16.0 gm/dL Final    Hct 10/13/2022 40.3  37.0 - 47.0 % Final    MCV 10/13/2022 87.8  80.0 - 94.0 fL Final    MCH 10/13/2022 27.7  27.0 - 31.0 pg Final    MCHC 10/13/2022 31.5 (L)  33.0 - 36.0 mg/dL Final    RDW 10/13/2022 14.4  11.5 - 17.0 % Final    Platelet 10/13/2022 212  130 - 400 x10(3)/mcL Final    MPV 10/13/2022 10.4  7.4 - 10.4 fL Final    Neut % 10/13/2022 59.7  % Final    Lymph % 10/13/2022 31.3  % Final    Mono % 10/13/2022 6.9  % Final    Eos % 10/13/2022 1.6  % Final    Basophil % 10/13/2022 0.4  % Final    Lymph # 10/13/2022 2.17  0.6 - 4.6 x10(3)/mcL Final    Neut # 10/13/2022 4.1  2.1 - 9.2 x10(3)/mcL Final    Mono # 10/13/2022 0.48  0.1 - 1.3 x10(3)/mcL Final    Eos # 10/13/2022 0.11  0 - 0.9 x10(3)/mcL Final    Baso # 10/13/2022 0.03  0 - 0.2 x10(3)/mcL Final    IG# 10/13/2022 0.01  0 - 0.04 x10(3)/mcL Final    IG% 10/13/2022 0.1  % Final         10/13/2022: CT chest abdomen pelvis with contrast, ileocolonic anastomosis, no adenopathy, no evidence of  metastatic disease, mild tiny fat umbilical hernia    Assessment and Plan     Problem List Items Addressed This Visit          Neuro    Neuropathy due to chemotherapeutic drug    Current Assessment & Plan     Continue gabapentin for now   Refer to neurology  Continue follow-up primary care as well  I have no functional limitations on this patient, she is in complete clinical remission.    I discussed with her a functional work evaluation, and her discussing with human resources work retraining if possible.  I do think it is worth her seeing Neurology for discussions of MRI and nerve conduction test and ? change in medications.             Relevant Orders    Ambulatory referral/consult to Neurology       Oncology    Malignant neoplasm of ascending colon - Primary    Current Assessment & Plan     Patient is now 1 year out from her surgical resection --complete clinical remission  Return to clinic in 3-6 months with CBC, CMP, CEA   Repeat colonoscopy--Dr. Hernadez was done 6/2022 (repeat in 3 year 6/2025)  Repeat CT scan October 2023 unless otherwise clinically indicated  F/u with primary care  Work evaluation -  Neuropathy and  Neurology evaluation with functional evaluation--She states she can stand for that long    And will let neurology and primary care discuss her disability issues- and HR at her work              Follow up in about 4 months (around 2/19/2023) for with cbc/cmp/cea.    Mckinley Pompa MD

## 2022-10-19 ENCOUNTER — OFFICE VISIT (OUTPATIENT)
Dept: HEMATOLOGY/ONCOLOGY | Facility: CLINIC | Age: 57
End: 2022-10-19
Payer: COMMERCIAL

## 2022-10-19 VITALS
HEART RATE: 76 BPM | TEMPERATURE: 98 F | WEIGHT: 179.88 LBS | BODY MASS INDEX: 30.71 KG/M2 | SYSTOLIC BLOOD PRESSURE: 172 MMHG | HEIGHT: 64 IN | OXYGEN SATURATION: 99 % | DIASTOLIC BLOOD PRESSURE: 95 MMHG

## 2022-10-19 DIAGNOSIS — C18.2 MALIGNANT NEOPLASM OF ASCENDING COLON: Primary | ICD-10-CM

## 2022-10-19 DIAGNOSIS — T45.1X5A NEUROPATHY DUE TO CHEMOTHERAPEUTIC DRUG: ICD-10-CM

## 2022-10-19 DIAGNOSIS — G62.0 NEUROPATHY DUE TO CHEMOTHERAPEUTIC DRUG: ICD-10-CM

## 2022-10-19 PROBLEM — Z95.828 PORT-A-CATH IN PLACE: Status: RESOLVED | Noted: 2021-10-29 | Resolved: 2022-10-19

## 2022-10-19 PROCEDURE — 4010F PR ACE/ARB THEARPY RXD/TAKEN: ICD-10-PCS | Mod: CPTII,S$GLB,, | Performed by: INTERNAL MEDICINE

## 2022-10-19 PROCEDURE — 99214 PR OFFICE/OUTPT VISIT, EST, LEVL IV, 30-39 MIN: ICD-10-PCS | Mod: S$GLB,,, | Performed by: INTERNAL MEDICINE

## 2022-10-19 PROCEDURE — 3080F PR MOST RECENT DIASTOLIC BLOOD PRESSURE >= 90 MM HG: ICD-10-PCS | Mod: CPTII,S$GLB,, | Performed by: INTERNAL MEDICINE

## 2022-10-19 PROCEDURE — 99999 PR PBB SHADOW E&M-EST. PATIENT-LVL IV: CPT | Mod: PBBFAC,,, | Performed by: INTERNAL MEDICINE

## 2022-10-19 PROCEDURE — 3077F SYST BP >= 140 MM HG: CPT | Mod: CPTII,S$GLB,, | Performed by: INTERNAL MEDICINE

## 2022-10-19 PROCEDURE — 99999 PR PBB SHADOW E&M-EST. PATIENT-LVL IV: ICD-10-PCS | Mod: PBBFAC,,, | Performed by: INTERNAL MEDICINE

## 2022-10-19 PROCEDURE — 3080F DIAST BP >= 90 MM HG: CPT | Mod: CPTII,S$GLB,, | Performed by: INTERNAL MEDICINE

## 2022-10-19 PROCEDURE — 1160F PR REVIEW ALL MEDS BY PRESCRIBER/CLIN PHARMACIST DOCUMENTED: ICD-10-PCS | Mod: CPTII,S$GLB,, | Performed by: INTERNAL MEDICINE

## 2022-10-19 PROCEDURE — 4010F ACE/ARB THERAPY RXD/TAKEN: CPT | Mod: CPTII,S$GLB,, | Performed by: INTERNAL MEDICINE

## 2022-10-19 PROCEDURE — 3077F PR MOST RECENT SYSTOLIC BLOOD PRESSURE >= 140 MM HG: ICD-10-PCS | Mod: CPTII,S$GLB,, | Performed by: INTERNAL MEDICINE

## 2022-10-19 PROCEDURE — 1160F RVW MEDS BY RX/DR IN RCRD: CPT | Mod: CPTII,S$GLB,, | Performed by: INTERNAL MEDICINE

## 2022-10-19 PROCEDURE — 99214 OFFICE O/P EST MOD 30 MIN: CPT | Mod: S$GLB,,, | Performed by: INTERNAL MEDICINE

## 2022-10-19 PROCEDURE — 1159F MED LIST DOCD IN RCRD: CPT | Mod: CPTII,S$GLB,, | Performed by: INTERNAL MEDICINE

## 2022-10-19 PROCEDURE — 1159F PR MEDICATION LIST DOCUMENTED IN MEDICAL RECORD: ICD-10-PCS | Mod: CPTII,S$GLB,, | Performed by: INTERNAL MEDICINE

## 2022-10-19 RX ORDER — GABAPENTIN 300 MG/1
CAPSULE ORAL
COMMUNITY
Start: 2022-10-10 | End: 2023-07-11

## 2022-10-19 NOTE — ASSESSMENT & PLAN NOTE
Continue gabapentin for now   Refer to neurology  Continue follow-up primary care as well  I have no functional limitations on this patient, she is in complete clinical remission.    I discussed with her a functional work evaluation, and her discussing with human resources work retraining if possible.  I do think it is worth her seeing Neurology for discussions of MRI and nerve conduction test and ? change in medications.

## 2022-10-27 ENCOUNTER — DOCUMENTATION ONLY (OUTPATIENT)
Dept: SURGICAL ONCOLOGY | Facility: CLINIC | Age: 57
End: 2022-10-27
Payer: COMMERCIAL

## 2022-10-27 NOTE — PROGRESS NOTES
3-29-22  DR. AMANDA SETH OFFICE NOTE    6 MONTH F/U- S/P L/R RIGHT COLON RESECTION    History of Present Illness     56-year-old female underwent laparoscopic/robotic right hemicolectomy in September 2021 with final pathology revealing moderately differentiated adenocarcinoma of the cecum, 6 cm in maximum diameter invading through the muscularis propria into pericolorectal tissue, 2 out of 36 resected lymph nodes were positive for metastatic adenocarcinoma.  She has been undergoing adjuvant chemotherapy with Dr. Pompa and is tolerating this well.  She reports satisfactory bowel function, no frequency or constipation.    Review of Systems     14 point review of systems was performed and was negative except for those pertinent positives and negatives mentioned in the history of present illness    Physical Exam   Vitals & Measurements   HR: 97(Peripheral)  BP: 205/87   HT: 162.00 cm  WT: 69.600 kg  BMI: 26.52     General: Alert and oriented, No acute distress.  Eye: Pupils are equal, round and reactive to light, Extraocular movements are intact, Normal conjunctiva, Vision unchanged.  HENT: Normal hearing, Oral mucosa is moist, No pharyngeal erythema, Ear canals patent, No sinus tenderness.  Neck: Supple, Non-tender, No carotid bruit, No jugular venous distention, No lymphadenopathy, No thyromegaly.  Respiratory: Lungs are clear to auscultation, Respirations are non-labored, Breath sounds are equal, Symmetrical chest wall expansion, No chest wall tenderness.  Cardiovascular: Normal rate, Regular rhythm, No murmur, No gallop, Good pulses equal in all extremities, Normal peripheral perfusion, No edema.  Genitourinary: No costovertebral angle tenderness, No inguinal tenderness, No urethral discharge, No lesions.  Lymphatics: No lymphadenopathy neck, axilla, groin.  Musculoskeletal: Normal range of motion, Normal strength, No tenderness, No swelling, No deformity, Normal gait.  Integumentary: Warm, Pink, Intact, Moist,  No pallor, No rash.  Cognition and Speech: Oriented, Speech clear and coherent, Functional cognition intact.   Abdomen: Soft nontender, nondistended, no palpable masses    Assessment/Plan     1. Carcinoma of ascending colon C18.2       Patient tolerating adjuvant chemotherapy very well   Satisfactory bowel function   Follow-up/surveillance colonoscopy at gastroenterologist discretion   Follow-up/surveillance imaging per medical oncology protocol   Return to clinic in 6 months          Office/Outpatient Visit Level 4 Established [id number] PC, Carcinoma of ascending colon, [facility], 03/29/22 10:08:00 CDT         Orders:     Nurse Visit, 09/20/22 9:05:00 CDT, Order for future visit    Referrals       Nurse Visit, 09/20/22 9:05:00 CDT, Order for future visit     Problem List/Past Medical History     Ongoing   2019-nCoV    Abnormal cervical Papanicolaou smear    Allergic rhinitis    Carcinoma of ascending colon    GERD - Gastro-esophageal reflux disease    Hypertension    Iron deficiency anemia    Left trigger finger    Obesity    Trigger finger of right hand    Vertigo    Historical   Iron deficiency anemia    Procedure/Surgical History   Catheter Insertion Mediport (None) (10/29/2021)  Fluoroscopic guidance for central venous access device placement, replacement (catheter only or complete), or removal (includes fluoroscopic guidance for vascular access and catheter manipulation, any necessary contrast injections through access site or c (10/29/2021)  Fluoroscopy of Superior Vena Cava using Low Osmolar Contrast, Guidance (10/29/2021)  Insertion of Infusion Device into Superior Vena Cava, Percutaneous Approach (10/29/2021)  Insertion of Totally Implantable Vascular Access Device into Chest Subcutaneous Tissue and Fascia, Open Approach (10/29/2021)  Insertion of tunneled centrally inserted central venous access device, with subcutaneous port; age 5 years or older (10/29/2021)  Insertion of tunneled centrally  inserted central venous access device, with subcutaneous port; age 5 years or older (10/29/2021)  Colon Resection Ascending Robotic Assist (Right) (09/09/2021)  Laparoscopy, surgical; colectomy, partial, with removal of terminal ileum with ileocolostomy (09/09/2021)  Laparoscopy, surgical; colectomy, partial, with removal of terminal ileum with ileocolostomy (09/09/2021)  Omental flap, intra-abdominal (List separately in addition to code for primary procedure) (09/09/2021)  Omental flap, intra-abdominal (List separately in addition to code for primary procedure) (09/09/2021)  Resection of Right Large Intestine, Percutaneous Endoscopic Approach (09/09/2021)  Robotic Assisted Procedure of Trunk Region, Percutaneous Endoscopic Approach (09/09/2021)  Transversus abdominis plane (TAP) block (abdominal plane block, rectus sheath block) bilateral; by injections (includes imaging guidance, when performed) (09/09/2021)  Transversus abdominis plane (TAP) block (abdominal plane block, rectus sheath block) bilateral; by injections (includes imaging guidance, when performed) (09/09/2021)  Lip Surgery (1973)    Medications     acetaminophen-hydrocodone 325 mg-5 mg oral tablet, 1 tab(s), Oral, q6hr    amlodipine 5 mg oral tablet, 5 mg= 1 tab(s), Oral, Daily    Celebrate Multivitamin oral capsule, 1 cap(s), Oral, Daily    ergocalciferol 50,000 intl units (1.25 mg) oral capsule, 77534 IntUnit= 1 cap(s), Oral, qWeek    ibuprofen 50 mg oral tablet, chewable, 50 mg= 1 tab(s), Chewed, q6hr, PRN    Magic Mouthwash Combination, See Instructions, 1 refills    meclizine 25 mg oral tablet, 25 mg= 1 tab(s), Oral, BID    meloxicam 7.5 mg oral tablet, 7.5 mg= 1 tab(s), Oral, Daily, PRN, 1 refills    MiraLax oral powder for reconstitution, 17 gm, Oral, Daily    Pantoprazole 40 mg ORAL EC-Tablet, 40 mg= 1 tab(s), Oral, Daily    prochlorperazine 5 mg oral tablet, 5 mg= 1 tab(s), Oral, q6hr, PRN    Vitamin C 250 mg oral tablet, 250 mg= 1 tab(s),  Oral, Daily    Zofran 8 mg oral tablet, See Instructions, PRN    Allergies     penicillin (Hives)    Social History       Abuse/Neglect      No, 03/24/2022      Alcohol      Past, 10/27/2021      Employment/School      Employed, Highest education level: Some college., 02/27/2017      Exercise      Exercise frequency: Daily. Exercise type: Walking., 10/25/2018      Home/Environment      Lives with Alone. Living situation: Home/Independent. Home equipment: BP machine. Alcohol abuse in household: No. Substance abuse in household: No. Smoker in household: No. Feels unsafe at home: No. Safe place to go: Yes. Family/Friends available for support: Yes. Concern for family members at home: No., 02/27/2017      Nutrition/Health      Regular, Wants to lose weight: Yes., 02/27/2017      Sexual      Spiritual/Cultural      Adventist, Yes, 09/08/2021      Substance Use      Never, 10/27/2021      Tobacco      Never (less than 100 in lifetime), N/A, 03/24/2022    Family History     Acute myocardial infarction.: Mother.    Anemia: Sister.    Crohn's disease.: Sister.    Diabetes mellitus type 2: Mother and Brother.    Hypertension.: Mother.    Father: History is negative    Immunizations       Vaccine    Date    Status      influenza virus vaccine, inactivated 01/20/2022 Given   COVID-19 MRNA, LNP-S, PF- Pfizer 05/02/2021 Given   COVID-19 MRNA, LNP-S, PF- Pfizer 04/11/2021 Given   influenza virus vaccine, inactivated 10/10/2019 Given   tetanus/diphtheria/pertussis, acel(Tdap) 02/27/2017 Given                      Result type:  Surgery Office/Clinic Note     Result date:  March 29, 2022 10:08 CDT     Result status:  Auth (Verified)     Result title:  Office Visit Note     Performed by:  Agustin Serra MD on March 29, 2022 10:10 CDT     Verified by:  Agustin Serra MD on March 29, 2022 10:10 CDT     Encounter info:  [id number], [facility], Clinic Visit, 3/29/2022 - 3/29/2022

## 2022-11-01 ENCOUNTER — OFFICE VISIT (OUTPATIENT)
Dept: SURGICAL ONCOLOGY | Facility: CLINIC | Age: 57
End: 2022-11-01
Payer: COMMERCIAL

## 2022-11-01 VITALS
BODY MASS INDEX: 29.71 KG/M2 | WEIGHT: 174 LBS | SYSTOLIC BLOOD PRESSURE: 185 MMHG | HEART RATE: 70 BPM | DIASTOLIC BLOOD PRESSURE: 82 MMHG | HEIGHT: 64 IN

## 2022-11-01 DIAGNOSIS — C18.2 MALIGNANT NEOPLASM OF ASCENDING COLON: Primary | ICD-10-CM

## 2022-11-01 PROCEDURE — 4010F ACE/ARB THERAPY RXD/TAKEN: CPT | Mod: CPTII,S$GLB,, | Performed by: SURGERY

## 2022-11-01 PROCEDURE — 3079F PR MOST RECENT DIASTOLIC BLOOD PRESSURE 80-89 MM HG: ICD-10-PCS | Mod: CPTII,S$GLB,, | Performed by: SURGERY

## 2022-11-01 PROCEDURE — 3079F DIAST BP 80-89 MM HG: CPT | Mod: CPTII,S$GLB,, | Performed by: SURGERY

## 2022-11-01 PROCEDURE — 4010F PR ACE/ARB THEARPY RXD/TAKEN: ICD-10-PCS | Mod: CPTII,S$GLB,, | Performed by: SURGERY

## 2022-11-01 PROCEDURE — 1159F PR MEDICATION LIST DOCUMENTED IN MEDICAL RECORD: ICD-10-PCS | Mod: CPTII,S$GLB,, | Performed by: SURGERY

## 2022-11-01 PROCEDURE — 3077F SYST BP >= 140 MM HG: CPT | Mod: CPTII,S$GLB,, | Performed by: SURGERY

## 2022-11-01 PROCEDURE — 3008F BODY MASS INDEX DOCD: CPT | Mod: CPTII,S$GLB,, | Performed by: SURGERY

## 2022-11-01 PROCEDURE — 1159F MED LIST DOCD IN RCRD: CPT | Mod: CPTII,S$GLB,, | Performed by: SURGERY

## 2022-11-01 PROCEDURE — 99214 PR OFFICE/OUTPT VISIT, EST, LEVL IV, 30-39 MIN: ICD-10-PCS | Mod: S$GLB,,, | Performed by: SURGERY

## 2022-11-01 PROCEDURE — 99214 OFFICE O/P EST MOD 30 MIN: CPT | Mod: S$GLB,,, | Performed by: SURGERY

## 2022-11-01 PROCEDURE — 99999 PR PBB SHADOW E&M-EST. PATIENT-LVL III: ICD-10-PCS | Mod: PBBFAC,,, | Performed by: SURGERY

## 2022-11-01 PROCEDURE — 3077F PR MOST RECENT SYSTOLIC BLOOD PRESSURE >= 140 MM HG: ICD-10-PCS | Mod: CPTII,S$GLB,, | Performed by: SURGERY

## 2022-11-01 PROCEDURE — 99999 PR PBB SHADOW E&M-EST. PATIENT-LVL III: CPT | Mod: PBBFAC,,, | Performed by: SURGERY

## 2022-11-01 PROCEDURE — 3008F PR BODY MASS INDEX (BMI) DOCUMENTED: ICD-10-PCS | Mod: CPTII,S$GLB,, | Performed by: SURGERY

## 2022-11-01 NOTE — PROGRESS NOTES
Chief complaint:  Colon cancer follow-up     HPI: 57 year-old female underwent laparoscopic/robotic right hemicolectomy in September 2021 with final pathology revealing moderately differentiated adenocarcinoma of the cecum, 6 cm in maximum diameter invading through the muscularis propria into pericolorectal tissue, 2 out of 36 resected lymph nodes were positive for metastatic adenocarcinoma.  She completed adjuvant chemotherapy with Dr. Pompa.  She reports satisfactory bowel function, no frequency or constipation.    Recent surveillance CT scan was reviewed, personally interpreted the images and reports.  There is no evidence of metastatic or recurrent disease.    Greater than 30 minutes was required for complete chart review, imaging review patient consultation and documentation            Past Medical and Surgical History  Allergies :   Penicillins    [facility]  Medical :   She has a past medical history of Colon cancer, GERD (gastroesophageal reflux disease), Hypertension, MAYNOR (iron deficiency anemia), and Port-A-Cath in place (10/29/2021).    Surgical :   She has a past surgical history that includes Mediport insertion, single; Laparoscopic partial colectomy (Right); Lip Surgery; Mediport removal; Mole removal; and Colonoscopy (06/06/2022).     Family History  Her family history is not on file.    Social History  She reports that she has never smoked. She has never used smokeless tobacco. She reports that she does not currently use alcohol. She reports that she does not use drugs.     Review of Systems   Constitutional:  Negative for appetite change, chills, diaphoresis and fever.   HENT:  Negative for congestion, drooling, ear discharge, ear pain and hearing loss.    Eyes:  Negative for discharge.   Respiratory:  Negative for apnea, cough, choking, chest tightness, shortness of breath and stridor.    Cardiovascular:  Negative for chest pain, palpitations and leg swelling.   Endocrine: Negative for cold  intolerance and heat intolerance.   Genitourinary:  Negative for difficulty urinating, dyspareunia, dysuria and hematuria.   Musculoskeletal:  Negative for arthralgias, gait problem and joint swelling.   Skin:  Negative for color change and rash.   Neurological:  Negative for dizziness, tremors, seizures, syncope, facial asymmetry, speech difficulty, light-headedness, numbness and headaches.   Psychiatric/Behavioral:  Negative for agitation and confusion.       Objective   Physical Exam  Vitals and nursing note reviewed.   Constitutional:       General: She is not in acute distress.     Appearance: Normal appearance. She is not ill-appearing, toxic-appearing or diaphoretic.   HENT:      Head: Normocephalic and atraumatic.      Right Ear: External ear normal.      Left Ear: External ear normal.      Nose: Nose normal.      Mouth/Throat:      Mouth: Mucous membranes are moist.      Pharynx: Oropharynx is clear.   Eyes:      General: No scleral icterus.     Extraocular Movements: Extraocular movements intact.      Conjunctiva/sclera: Conjunctivae normal.      Pupils: Pupils are equal, round, and reactive to light.   Cardiovascular:      Rate and Rhythm: Normal rate and regular rhythm.      Pulses: Normal pulses.      Heart sounds: No murmur heard.    No friction rub. No gallop.   Pulmonary:      Effort: Pulmonary effort is normal. No respiratory distress.      Breath sounds: Normal breath sounds. No stridor.   Abdominal:      General: Abdomen is flat. There is no distension.      Palpations: Abdomen is soft. There is no mass.      Tenderness: There is no abdominal tenderness. There is no right CVA tenderness, left CVA tenderness, guarding or rebound.      Hernia: No hernia is present.   Musculoskeletal:         General: No swelling, tenderness, deformity or signs of injury.      Cervical back: Normal range of motion and neck supple. No rigidity or tenderness.      Right lower leg: No edema.      Left lower leg: No  "edema.   Lymphadenopathy:      Cervical: No cervical adenopathy.   Skin:     General: Skin is warm.      Capillary Refill: Capillary refill takes less than 2 seconds.      Coloration: Skin is not jaundiced or pale.      Findings: No bruising, erythema, lesion or rash.   Neurological:      General: No focal deficit present.      Mental Status: She is alert and oriented to person, place, and time. Mental status is at baseline.      Cranial Nerves: No cranial nerve deficit.      Sensory: No sensory deficit.      Motor: No weakness.      Coordination: Coordination normal.      Gait: Gait normal.   Psychiatric:         Mood and Affect: Mood normal.         Behavior: Behavior normal.         Thought Content: Thought content normal.         Judgment: Judgment normal.     VITAL SIGNS: 24 HR MIN & MAX LAST    @FLOWSTAT(6:24::1)@           @FLOWSTAT(5:24::1)@  (!) 185/82     @FLOWSTAT(8:24::1)@  70     @FLOWSTAT(9:24::1)@       @FLOWSTAT(10:24::1)@         HT: 5' 4" (162.6 cm)  WT: 78.9 kg (174 lb)  BMI: 29.9       Assessment & Plan     Stage III adenocarcinoma cecum status post laparoscopic/robotic right hemicolectomy September 2021, adjuvant chemotherapy    No evidence of disease  Continue surveillance imaging per medical oncology protocol   Return to clinic in 6 months    Agustin Serra MD  Surgical Oncology  Complex General, Gastrointestinal and Hepatobiliary Surgery        "

## 2023-01-11 ENCOUNTER — TELEPHONE (OUTPATIENT)
Dept: NEUROLOGY | Facility: CLINIC | Age: 58
End: 2023-01-11
Payer: COMMERCIAL

## 2023-01-20 ENCOUNTER — TELEPHONE (OUTPATIENT)
Dept: NEUROLOGY | Facility: CLINIC | Age: 58
End: 2023-01-20
Payer: COMMERCIAL

## 2023-02-17 ENCOUNTER — LAB VISIT (OUTPATIENT)
Dept: LAB | Facility: HOSPITAL | Age: 58
End: 2023-02-17
Attending: INTERNAL MEDICINE
Payer: COMMERCIAL

## 2023-02-17 DIAGNOSIS — C18.2 MALIGNANT NEOPLASM OF ASCENDING COLON: ICD-10-CM

## 2023-02-17 LAB
ALBUMIN SERPL-MCNC: 4 G/DL (ref 3.5–5)
ALBUMIN/GLOB SERPL: 1.1 RATIO (ref 1.1–2)
ALP SERPL-CCNC: 127 UNIT/L (ref 40–150)
ALT SERPL-CCNC: 18 UNIT/L (ref 0–55)
AST SERPL-CCNC: 24 UNIT/L (ref 5–34)
BASOPHILS # BLD AUTO: 0.02 X10(3)/MCL (ref 0–0.2)
BASOPHILS NFR BLD AUTO: 0.3 %
BILIRUBIN DIRECT+TOT PNL SERPL-MCNC: 0.5 MG/DL
BUN SERPL-MCNC: 8.8 MG/DL (ref 9.8–20.1)
CALCIUM SERPL-MCNC: 9.8 MG/DL (ref 8.4–10.2)
CEA SERPL-MCNC: <1.73 NG/ML (ref 0–3)
CHLORIDE SERPL-SCNC: 108 MMOL/L (ref 98–107)
CO2 SERPL-SCNC: 26 MMOL/L (ref 22–29)
CREAT SERPL-MCNC: 0.95 MG/DL (ref 0.55–1.02)
EOSINOPHIL # BLD AUTO: 0.06 X10(3)/MCL (ref 0–0.9)
EOSINOPHIL NFR BLD AUTO: 0.9 %
ERYTHROCYTE [DISTWIDTH] IN BLOOD BY AUTOMATED COUNT: 14.4 % (ref 11.5–17)
GFR SERPLBLD CREATININE-BSD FMLA CKD-EPI: >60 MLS/MIN/1.73/M2
GLOBULIN SER-MCNC: 3.5 GM/DL (ref 2.4–3.5)
GLUCOSE SERPL-MCNC: 97 MG/DL (ref 74–100)
HCT VFR BLD AUTO: 39.5 % (ref 37–47)
HGB BLD-MCNC: 12.4 GM/DL (ref 12–16)
IMM GRANULOCYTES # BLD AUTO: 0.03 X10(3)/MCL (ref 0–0.04)
IMM GRANULOCYTES NFR BLD AUTO: 0.4 %
LYMPHOCYTES # BLD AUTO: 2.34 X10(3)/MCL (ref 0.6–4.6)
LYMPHOCYTES NFR BLD AUTO: 34.8 %
MCH RBC QN AUTO: 27.8 PG
MCHC RBC AUTO-ENTMCNC: 31.4 MG/DL (ref 33–36)
MCV RBC AUTO: 88.6 FL (ref 80–94)
MONOCYTES # BLD AUTO: 0.52 X10(3)/MCL (ref 0.1–1.3)
MONOCYTES NFR BLD AUTO: 7.7 %
NEUTROPHILS # BLD AUTO: 3.76 X10(3)/MCL (ref 2.1–9.2)
NEUTROPHILS NFR BLD AUTO: 55.9 %
PLATELET # BLD AUTO: 261 X10(3)/MCL (ref 130–400)
PMV BLD AUTO: 10.5 FL (ref 7.4–10.4)
POTASSIUM SERPL-SCNC: 3.8 MMOL/L (ref 3.5–5.1)
PROT SERPL-MCNC: 7.5 GM/DL (ref 6.4–8.3)
RBC # BLD AUTO: 4.46 X10(6)/MCL (ref 4.2–5.4)
SODIUM SERPL-SCNC: 141 MMOL/L (ref 136–145)
WBC # SPEC AUTO: 6.7 X10(3)/MCL (ref 4.5–11.5)

## 2023-02-17 PROCEDURE — 36415 COLL VENOUS BLD VENIPUNCTURE: CPT

## 2023-02-17 PROCEDURE — 80053 COMPREHEN METABOLIC PANEL: CPT

## 2023-02-17 PROCEDURE — 82378 CARCINOEMBRYONIC ANTIGEN: CPT

## 2023-02-17 PROCEDURE — 85025 COMPLETE CBC W/AUTO DIFF WBC: CPT

## 2023-02-22 NOTE — PROGRESS NOTES
Heme/Onc Progress Note    PATIENT: Ami Smith  MRN: [id number]  DATE: 2/23/2023  Chief Complaint: Follow-up (4 month f/u. States the gabapentin is not helping )    Diagnosis:   Stage IIIb (pT3N1b Adenocarcinoma of cecum). Diagnosed 7/20/21     Current Treatment: Observation     Treatment History:   9/9/21:Right hemicolectomy   11/1/21- 4/7/22: Adjuvant FOLFOX (No 5FU bolus) --4/522     Tumor Site: Cecum   Histologic Type: Adenocarcinoma   Histologic Grade: G2: Moderately differentiated   Tumor Size: 6 Centimeters (cm)   Tumor Extension: Tumor invades through the muscularis propria into pericolorectal tissue   Macroscopic Tumor Perforation: Not identified   Lymphovascular Invasion: Indeterminate.   Perineural Invasion: Not identified   Treatment Effect: No known presurgical therapy   Margins   Margins: All margins are uninvolved by invasive carcinoma, high grade dysplasia / intramucosal carcinoma, and low grade dysplasia   Margins Examined: Proximal; Distal; Radial (circumferential) or Mesenteric   Distance of Invasive Carcinoma from Closest Margin: 5 cm   Closest Margin: Radial (circumferential) or Mesenteric   Distance of Tumor from Radial (circumferential) Margin: 5 cm   Lymph Nodes   Number of Lymph Nodes Involved: 2   Number of Lymph Nodes Examined: 36   Tumor Deposits: Not identified   Pathologic Stage Classification   Primary Tumor: pT3   Regional Lymph Nodes: pN1b   Immunohistochemical stains for MLH1, MSH2, MSH6, and PMS2 are intact and normal. There is no evidence of DNA mismatch repair deficiency. Indicating the tumor is microsatellite stable, and HNP CC is very unlikely   Clinical History:   This patient presented to her PCP in July 2021 with a 3 week history of rectal bleeding and 15 lb weight loss. She was found to have iron deficiency anemia and was referred for colonoscopy.   7/20/21: Colonoscopy showed a 4 cm infiltrative, ulcerative fungating mass in the cecum and proximal ascending  colon. Pathology revealed fragments of adenocarcinoma, moderately differentiated. CT abd/pel was performed showing thickening of the cecal wall with local adenopathy. Pre op CEA 3.2     7/29/2021: CT scan abdomen pelvis cecal wall thickening with adjacent fatty infiltration adenopathy consistent with history of cecal neoplasm, diffuse fatty liver, angiomyolipoma of the right kidney, left renal cyst     8/19/21: CT chest-unremarkable.   She underwent right hemicolectomy and is referred to us for further treatment recommendations.         02/23/2023  Patient presents for follow up for her colon cancer. She still has some neuropathy to the fingertips and bottoms of feet. She takes gabapentin, but only at night due to side effects. Her labs are normal. CEA less than 1. No abdominal pain, N/V/D or change in bowel habits. Occasional constipation.   Past Medical History:   Diagnosis Date    Colon cancer     GERD (gastroesophageal reflux disease)     Hypertension     MAYNOR (iron deficiency anemia)     Port-A-Cath in place 10/29/2021    Dr. Serra        Current Outpatient Medications:     amLODIPine (NORVASC) 5 MG tablet, Take 5 mg by mouth once daily., Disp: , Rfl:     ergocalciferol (ERGOCALCIFEROL) 50,000 unit Cap, Take 50,000 Int'l Units by mouth., Disp: , Rfl:     gabapentin (NEURONTIN) 300 MG capsule, Take by mouth. Takes 1 cap in AM 2 caps PM, Disp: , Rfl:     HYDROcodone-acetaminophen (NORCO) 5-325 mg per tablet, Take by mouth., Disp: , Rfl:     IBUPROFEN ORAL, Take 50 mg by mouth., Disp: , Rfl:     meclizine (ANTIVERT) 25 MG tablet, Take 25 mg by mouth 3 (three) times daily as needed., Disp: , Rfl:     pantoprazole (PROTONIX) 40 MG tablet, Take 40 mg by mouth., Disp: , Rfl:     polyethylene glycol (GLYCOLAX) 17 gram/dose powder, Take 17 g by mouth., Disp: , Rfl:      Review of Systems:   Pertinent positives and negatives included in the HPI. Otherwise a complete review of   systems is negative.  Review of Systems        Objective:     Vitals:    02/23/23 1059   BP: (!) 151/74   Pulse: 91   Temp: 98.2 °F (36.8 °C)         Physical Exam  Constitutional:       Appearance: Normal appearance.   HENT:      Head: Normocephalic and atraumatic.      Nose: Nose normal.      Mouth/Throat:      Mouth: Mucous membranes are moist.   Eyes:      Extraocular Movements: Extraocular movements intact.      Conjunctiva/sclera: Conjunctivae normal.      Pupils: Pupils are equal, round, and reactive to light.   Cardiovascular:      Rate and Rhythm: Normal rate and regular rhythm.      Pulses: Normal pulses.   Pulmonary:      Effort: Pulmonary effort is normal.      Breath sounds: Normal breath sounds.   Abdominal:      General: Bowel sounds are normal.      Palpations: Abdomen is soft.   Musculoskeletal:      Cervical back: Normal range of motion and neck supple.   Neurological:      General: No focal deficit present.      Mental Status: She is alert and oriented to person, place, and time. Mental status is at baseline.   Psychiatric:         Mood and Affect: Mood normal.         Behavior: Behavior normal.       Assessment and Plan   Stage IIIB (pT3, Pn1b, cM0) neoplasm of ascending colon  Peripheral Neuropathy, stable    PLAN:  Repeat colonoscopy--Dr. Hernadez was done 6/2022 (repeat 6/2025)      Follow up in about 2 months (around 4/23/2023) for Labs and OV with Ruth.

## 2023-02-23 ENCOUNTER — OFFICE VISIT (OUTPATIENT)
Dept: HEMATOLOGY/ONCOLOGY | Facility: CLINIC | Age: 58
End: 2023-02-23
Payer: COMMERCIAL

## 2023-02-23 VITALS
TEMPERATURE: 98 F | HEART RATE: 91 BPM | DIASTOLIC BLOOD PRESSURE: 74 MMHG | OXYGEN SATURATION: 99 % | BODY MASS INDEX: 28.49 KG/M2 | SYSTOLIC BLOOD PRESSURE: 151 MMHG | HEIGHT: 64 IN | WEIGHT: 166.88 LBS

## 2023-02-23 DIAGNOSIS — C18.2 MALIGNANT NEOPLASM OF ASCENDING COLON: Primary | ICD-10-CM

## 2023-02-23 PROCEDURE — 3077F PR MOST RECENT SYSTOLIC BLOOD PRESSURE >= 140 MM HG: ICD-10-PCS | Mod: CPTII,S$GLB,, | Performed by: NURSE PRACTITIONER

## 2023-02-23 PROCEDURE — 3008F PR BODY MASS INDEX (BMI) DOCUMENTED: ICD-10-PCS | Mod: CPTII,S$GLB,, | Performed by: NURSE PRACTITIONER

## 2023-02-23 PROCEDURE — 1160F RVW MEDS BY RX/DR IN RCRD: CPT | Mod: CPTII,S$GLB,, | Performed by: NURSE PRACTITIONER

## 2023-02-23 PROCEDURE — 99213 PR OFFICE/OUTPT VISIT, EST, LEVL III, 20-29 MIN: ICD-10-PCS | Mod: S$GLB,,, | Performed by: NURSE PRACTITIONER

## 2023-02-23 PROCEDURE — 3077F SYST BP >= 140 MM HG: CPT | Mod: CPTII,S$GLB,, | Performed by: NURSE PRACTITIONER

## 2023-02-23 PROCEDURE — 3078F PR MOST RECENT DIASTOLIC BLOOD PRESSURE < 80 MM HG: ICD-10-PCS | Mod: CPTII,S$GLB,, | Performed by: NURSE PRACTITIONER

## 2023-02-23 PROCEDURE — 99213 OFFICE O/P EST LOW 20 MIN: CPT | Mod: S$GLB,,, | Performed by: NURSE PRACTITIONER

## 2023-02-23 PROCEDURE — 99999 PR PBB SHADOW E&M-EST. PATIENT-LVL IV: ICD-10-PCS | Mod: PBBFAC,,, | Performed by: NURSE PRACTITIONER

## 2023-02-23 PROCEDURE — 1159F MED LIST DOCD IN RCRD: CPT | Mod: CPTII,S$GLB,, | Performed by: NURSE PRACTITIONER

## 2023-02-23 PROCEDURE — 1159F PR MEDICATION LIST DOCUMENTED IN MEDICAL RECORD: ICD-10-PCS | Mod: CPTII,S$GLB,, | Performed by: NURSE PRACTITIONER

## 2023-02-23 PROCEDURE — 3078F DIAST BP <80 MM HG: CPT | Mod: CPTII,S$GLB,, | Performed by: NURSE PRACTITIONER

## 2023-02-23 PROCEDURE — 99999 PR PBB SHADOW E&M-EST. PATIENT-LVL IV: CPT | Mod: PBBFAC,,, | Performed by: NURSE PRACTITIONER

## 2023-02-23 PROCEDURE — 3008F BODY MASS INDEX DOCD: CPT | Mod: CPTII,S$GLB,, | Performed by: NURSE PRACTITIONER

## 2023-02-23 PROCEDURE — 1160F PR REVIEW ALL MEDS BY PRESCRIBER/CLIN PHARMACIST DOCUMENTED: ICD-10-PCS | Mod: CPTII,S$GLB,, | Performed by: NURSE PRACTITIONER

## 2023-04-04 ENCOUNTER — PATIENT MESSAGE (OUTPATIENT)
Dept: RESEARCH | Facility: HOSPITAL | Age: 58
End: 2023-04-04
Payer: COMMERCIAL

## 2023-04-19 ENCOUNTER — PATIENT MESSAGE (OUTPATIENT)
Dept: RESEARCH | Facility: HOSPITAL | Age: 58
End: 2023-04-19
Payer: COMMERCIAL

## 2023-04-24 ENCOUNTER — HOSPITAL ENCOUNTER (OUTPATIENT)
Dept: RADIOLOGY | Facility: HOSPITAL | Age: 58
Discharge: HOME | End: 2023-04-24
Attending: NURSE PRACTITIONER
Payer: COMMERCIAL

## 2023-04-24 DIAGNOSIS — C18.2 MALIGNANT NEOPLASM OF ASCENDING COLON: ICD-10-CM

## 2023-04-24 LAB
CREAT SERPL-MCNC: 1.1 MG/DL (ref 0.5–1.4)
SAMPLE: NORMAL

## 2023-04-24 PROCEDURE — 25500020 PHARM REV CODE 255: Performed by: NURSE PRACTITIONER

## 2023-04-24 PROCEDURE — 71260 CT THORAX DX C+: CPT | Mod: TC

## 2023-04-24 PROCEDURE — 74177 CT ABD & PELVIS W/CONTRAST: CPT | Mod: TC

## 2023-04-24 RX ADMIN — IOPAMIDOL 100 ML: 755 INJECTION, SOLUTION INTRAVENOUS at 08:04

## 2023-04-25 NOTE — PROGRESS NOTES
Heme/Onc Progress Note    PATIENT: Ami Smith  MRN: [id number]  DATE: 4/26/2023  Chief Complaint: Follow-up (2 month f/u)    Diagnosis:   Stage IIIb (pT3N1b Adenocarcinoma of cecum). Diagnosed 7/20/21     Current Treatment: Observation     Treatment History:   9/9/21:Right hemicolectomy   11/1/21- 4/7/22: Adjuvant FOLFOX (No 5FU bolus)     Tumor Site: Cecum   Histologic Type: Adenocarcinoma   Histologic Grade: G2: Moderately differentiated   Tumor Size: 6 Centimeters (cm)   Tumor Extension: Tumor invades through the muscularis propria into pericolorectal tissue   Macroscopic Tumor Perforation: Not identified   Lymphovascular Invasion: Indeterminate.   Perineural Invasion: Not identified   Treatment Effect: No known presurgical therapy   Margins   Margins: All margins are uninvolved by invasive carcinoma, high grade dysplasia / intramucosal carcinoma, and low grade dysplasia   Margins Examined: Proximal; Distal; Radial (circumferential) or Mesenteric   Distance of Invasive Carcinoma from Closest Margin: 5 cm   Closest Margin: Radial (circumferential) or Mesenteric   Distance of Tumor from Radial (circumferential) Margin: 5 cm   Lymph Nodes   Number of Lymph Nodes Involved: 2   Number of Lymph Nodes Examined: 36   Tumor Deposits: Not identified   Pathologic Stage Classification   Primary Tumor: pT3   Regional Lymph Nodes: pN1b   Immunohistochemical stains for MLH1, MSH2, MSH6, and PMS2 are intact and normal. There is no evidence of DNA mismatch repair deficiency. Indicating the tumor is microsatellite stable, and HNP CC is very unlikely   Clinical History:   This patient presented to her PCP in July 2021 with a 3 week history of rectal bleeding and 15 lb weight loss. She was found to have iron deficiency anemia and was referred for colonoscopy.   7/20/21: Colonoscopy showed a 4 cm infiltrative, ulcerative fungating mass in the cecum and proximal ascending colon. Pathology revealed fragments of  adenocarcinoma, moderately differentiated. CT abd/pel was performed showing thickening of the cecal wall with local adenopathy. Pre op CEA 3.2     7/29/2021: CT scan abdomen pelvis cecal wall thickening with adjacent fatty infiltration adenopathy consistent with history of cecal neoplasm, diffuse fatty liver, angiomyolipoma of the right kidney, left renal cyst     8/19/21: CT chest-unremarkable.   She underwent right hemicolectomy and is referred to us for further treatment recommendations.     4/24/23: CT CAP-No acute process seen or evidence of metastatic disease to the chest abdomen or pelvis.         04/26/2023  Patient presents for follow up for her colon cancer. Recent restaging CT CAP show no evidence of metastatic disease. She still has some neuropathy to the fingertips and bottoms of feet. She takes gabapentin, but only at night due to side effects. Her labs are normal. CEA less than 1. No abdominal pain, N/V/D or change in bowel habits. Occasional constipation.   Past Medical History:   Diagnosis Date    Colon cancer     GERD (gastroesophageal reflux disease)     Hypertension     MAYNOR (iron deficiency anemia)     Port-A-Cath in place 10/29/2021    Dr. Serra        Current Outpatient Medications:     amLODIPine (NORVASC) 5 MG tablet, Take 5 mg by mouth once daily., Disp: , Rfl:     ergocalciferol (ERGOCALCIFEROL) 50,000 unit Cap, Take 50,000 Int'l Units by mouth., Disp: , Rfl:     gabapentin (NEURONTIN) 300 MG capsule, Take by mouth. Takes 1 cap in AM 2 caps PM, Disp: , Rfl:     HYDROcodone-acetaminophen (NORCO) 5-325 mg per tablet, Take by mouth., Disp: , Rfl:     IBUPROFEN ORAL, Take 50 mg by mouth., Disp: , Rfl:     meclizine (ANTIVERT) 25 MG tablet, Take 25 mg by mouth 3 (three) times daily as needed., Disp: , Rfl:     pantoprazole (PROTONIX) 40 MG tablet, Take 40 mg by mouth., Disp: , Rfl:     polyethylene glycol (GLYCOLAX) 17 gram/dose powder, Take 17 g by mouth., Disp: , Rfl:      Review of Systems:    Pertinent positives and negatives included in the HPI. Otherwise a complete review of   systems is negative.  Review of Systems       Objective:     Vitals:    04/26/23 1048   BP: (!) 159/82   Pulse: 90   Temp: 97.9 °F (36.6 °C)           Physical Exam  Constitutional:       Appearance: Normal appearance.   HENT:      Head: Normocephalic and atraumatic.      Nose: Nose normal.      Mouth/Throat:      Mouth: Mucous membranes are moist.   Eyes:      Extraocular Movements: Extraocular movements intact.      Conjunctiva/sclera: Conjunctivae normal.      Pupils: Pupils are equal, round, and reactive to light.   Cardiovascular:      Rate and Rhythm: Normal rate and regular rhythm.      Pulses: Normal pulses.   Pulmonary:      Effort: Pulmonary effort is normal.      Breath sounds: Normal breath sounds.   Abdominal:      General: Bowel sounds are normal.      Palpations: Abdomen is soft.   Musculoskeletal:      Cervical back: Normal range of motion and neck supple.   Neurological:      General: No focal deficit present.      Mental Status: She is alert and oriented to person, place, and time. Mental status is at baseline.   Psychiatric:         Mood and Affect: Mood normal.         Behavior: Behavior normal.       Assessment and Plan   Stage IIIB (pT3, Pn1b, cM0) neoplasm of ascending colon. Diagnosed 7/20/2021. Completed adjuvant chemotherapy 4/7/22.  Peripheral Neuropathy, stable    PLAN:  Repeat colonoscopy--Dr. Hernadez was done 6/2022 (repeat 6/2025)  History and PE every 3-6 m X 2 y, then q 6 m for a total of 5 y  CT CAP q 6-12 m (category 2B for frequency <12 months) from date of surgery for a total of 5 y.  Repeat scans in 1 year (4/2024)  Colonoscopy in 1 y after surgery except if no preoperative colonoscopy due to obstructing lesion, colonoscopy in 3-6 mo.        Follow up in 6 months (on 10/26/2023) for labs and OV with Ruth.

## 2023-04-26 ENCOUNTER — OFFICE VISIT (OUTPATIENT)
Dept: HEMATOLOGY/ONCOLOGY | Facility: CLINIC | Age: 58
End: 2023-04-26
Payer: COMMERCIAL

## 2023-04-26 VITALS
BODY MASS INDEX: 27.43 KG/M2 | TEMPERATURE: 98 F | WEIGHT: 160.69 LBS | HEART RATE: 90 BPM | DIASTOLIC BLOOD PRESSURE: 82 MMHG | HEIGHT: 64 IN | SYSTOLIC BLOOD PRESSURE: 159 MMHG | OXYGEN SATURATION: 99 %

## 2023-04-26 DIAGNOSIS — C18.2 MALIGNANT NEOPLASM OF ASCENDING COLON: Primary | ICD-10-CM

## 2023-04-26 PROCEDURE — 99999 PR PBB SHADOW E&M-EST. PATIENT-LVL III: ICD-10-PCS | Mod: PBBFAC,,, | Performed by: NURSE PRACTITIONER

## 2023-04-26 PROCEDURE — 99213 OFFICE O/P EST LOW 20 MIN: CPT | Mod: S$GLB,,, | Performed by: NURSE PRACTITIONER

## 2023-04-26 PROCEDURE — 3008F PR BODY MASS INDEX (BMI) DOCUMENTED: ICD-10-PCS | Mod: CPTII,S$GLB,, | Performed by: NURSE PRACTITIONER

## 2023-04-26 PROCEDURE — 1159F PR MEDICATION LIST DOCUMENTED IN MEDICAL RECORD: ICD-10-PCS | Mod: CPTII,S$GLB,, | Performed by: NURSE PRACTITIONER

## 2023-04-26 PROCEDURE — 1160F RVW MEDS BY RX/DR IN RCRD: CPT | Mod: CPTII,S$GLB,, | Performed by: NURSE PRACTITIONER

## 2023-04-26 PROCEDURE — 3079F DIAST BP 80-89 MM HG: CPT | Mod: CPTII,S$GLB,, | Performed by: NURSE PRACTITIONER

## 2023-04-26 PROCEDURE — 99213 PR OFFICE/OUTPT VISIT, EST, LEVL III, 20-29 MIN: ICD-10-PCS | Mod: S$GLB,,, | Performed by: NURSE PRACTITIONER

## 2023-04-26 PROCEDURE — 3079F PR MOST RECENT DIASTOLIC BLOOD PRESSURE 80-89 MM HG: ICD-10-PCS | Mod: CPTII,S$GLB,, | Performed by: NURSE PRACTITIONER

## 2023-04-26 PROCEDURE — 1160F PR REVIEW ALL MEDS BY PRESCRIBER/CLIN PHARMACIST DOCUMENTED: ICD-10-PCS | Mod: CPTII,S$GLB,, | Performed by: NURSE PRACTITIONER

## 2023-04-26 PROCEDURE — 3077F PR MOST RECENT SYSTOLIC BLOOD PRESSURE >= 140 MM HG: ICD-10-PCS | Mod: CPTII,S$GLB,, | Performed by: NURSE PRACTITIONER

## 2023-04-26 PROCEDURE — 1159F MED LIST DOCD IN RCRD: CPT | Mod: CPTII,S$GLB,, | Performed by: NURSE PRACTITIONER

## 2023-04-26 PROCEDURE — 3008F BODY MASS INDEX DOCD: CPT | Mod: CPTII,S$GLB,, | Performed by: NURSE PRACTITIONER

## 2023-04-26 PROCEDURE — 99999 PR PBB SHADOW E&M-EST. PATIENT-LVL III: CPT | Mod: PBBFAC,,, | Performed by: NURSE PRACTITIONER

## 2023-04-26 PROCEDURE — 3077F SYST BP >= 140 MM HG: CPT | Mod: CPTII,S$GLB,, | Performed by: NURSE PRACTITIONER

## 2023-05-05 DIAGNOSIS — Z12.31 ENCOUNTER FOR SCREENING MAMMOGRAM FOR MALIGNANT NEOPLASM OF BREAST: Primary | ICD-10-CM

## 2023-05-08 ENCOUNTER — HOSPITAL ENCOUNTER (OUTPATIENT)
Dept: RADIOLOGY | Facility: HOSPITAL | Age: 58
Discharge: HOME | End: 2023-05-08
Attending: STUDENT IN AN ORGANIZED HEALTH CARE EDUCATION/TRAINING PROGRAM
Payer: COMMERCIAL

## 2023-05-08 DIAGNOSIS — Z12.31 ENCOUNTER FOR SCREENING MAMMOGRAM FOR MALIGNANT NEOPLASM OF BREAST: ICD-10-CM

## 2023-05-08 PROCEDURE — 77063 BREAST TOMOSYNTHESIS BI: CPT | Mod: 26,,, | Performed by: RADIOLOGY

## 2023-05-08 PROCEDURE — 77067 SCR MAMMO BI INCL CAD: CPT | Mod: 26,,, | Performed by: RADIOLOGY

## 2023-05-08 PROCEDURE — 77063 MAMMO DIGITAL SCREENING BILAT WITH TOMO: ICD-10-PCS | Mod: 26,,, | Performed by: RADIOLOGY

## 2023-05-08 PROCEDURE — 77067 MAMMO DIGITAL SCREENING BILAT WITH TOMO: ICD-10-PCS | Mod: 26,,, | Performed by: RADIOLOGY

## 2023-05-08 PROCEDURE — 77067 SCR MAMMO BI INCL CAD: CPT | Mod: TC

## 2023-05-09 ENCOUNTER — PATIENT MESSAGE (OUTPATIENT)
Dept: RESEARCH | Facility: HOSPITAL | Age: 58
End: 2023-05-09
Payer: COMMERCIAL

## 2023-05-10 ENCOUNTER — OFFICE VISIT (OUTPATIENT)
Dept: SURGICAL ONCOLOGY | Facility: CLINIC | Age: 58
End: 2023-05-10
Payer: COMMERCIAL

## 2023-05-10 VITALS
DIASTOLIC BLOOD PRESSURE: 89 MMHG | WEIGHT: 158.63 LBS | HEART RATE: 81 BPM | SYSTOLIC BLOOD PRESSURE: 149 MMHG | BODY MASS INDEX: 27.08 KG/M2 | HEIGHT: 64 IN

## 2023-05-10 DIAGNOSIS — Z12.31 VISIT FOR SCREENING MAMMOGRAM: Primary | ICD-10-CM

## 2023-05-10 DIAGNOSIS — C18.2 MALIGNANT NEOPLASM OF ASCENDING COLON: Primary | ICD-10-CM

## 2023-05-10 PROCEDURE — 3079F PR MOST RECENT DIASTOLIC BLOOD PRESSURE 80-89 MM HG: ICD-10-PCS | Mod: CPTII,S$GLB,, | Performed by: SURGERY

## 2023-05-10 PROCEDURE — 1159F MED LIST DOCD IN RCRD: CPT | Mod: CPTII,S$GLB,, | Performed by: SURGERY

## 2023-05-10 PROCEDURE — 99214 OFFICE O/P EST MOD 30 MIN: CPT | Mod: S$GLB,,, | Performed by: SURGERY

## 2023-05-10 PROCEDURE — 3079F DIAST BP 80-89 MM HG: CPT | Mod: CPTII,S$GLB,, | Performed by: SURGERY

## 2023-05-10 PROCEDURE — 3008F PR BODY MASS INDEX (BMI) DOCUMENTED: ICD-10-PCS | Mod: CPTII,S$GLB,, | Performed by: SURGERY

## 2023-05-10 PROCEDURE — 1159F PR MEDICATION LIST DOCUMENTED IN MEDICAL RECORD: ICD-10-PCS | Mod: CPTII,S$GLB,, | Performed by: SURGERY

## 2023-05-10 PROCEDURE — 3077F SYST BP >= 140 MM HG: CPT | Mod: CPTII,S$GLB,, | Performed by: SURGERY

## 2023-05-10 PROCEDURE — 99214 PR OFFICE/OUTPT VISIT, EST, LEVL IV, 30-39 MIN: ICD-10-PCS | Mod: S$GLB,,, | Performed by: SURGERY

## 2023-05-10 PROCEDURE — 3008F BODY MASS INDEX DOCD: CPT | Mod: CPTII,S$GLB,, | Performed by: SURGERY

## 2023-05-10 PROCEDURE — 99999 PR PBB SHADOW E&M-EST. PATIENT-LVL III: ICD-10-PCS | Mod: PBBFAC,,, | Performed by: SURGERY

## 2023-05-10 PROCEDURE — 99999 PR PBB SHADOW E&M-EST. PATIENT-LVL III: CPT | Mod: PBBFAC,,, | Performed by: SURGERY

## 2023-05-10 PROCEDURE — 3077F PR MOST RECENT SYSTOLIC BLOOD PRESSURE >= 140 MM HG: ICD-10-PCS | Mod: CPTII,S$GLB,, | Performed by: SURGERY

## 2023-05-10 NOTE — PROGRESS NOTES
Chief complaint:  Colon cancer follow-up     HPI: 57 year-old female underwent laparoscopic/robotic right hemicolectomy in September 2021 with final pathology revealing moderately differentiated adenocarcinoma of the cecum, 6 cm in maximum diameter invading through the muscularis propria into pericolorectal tissue, 2 out of 36 resected lymph nodes were positive for metastatic adenocarcinoma.  She completed adjuvant chemotherapy with Dr. Pompa.  She reports satisfactory bowel function, no frequency or constipation.  Surveillance CT scans have showed no evidence of recurrent or metastatic disease    She presents for breast cancer surveillance today after recent mammography    She denies breast masses, skin changes, nipple inversion or discharge on self-breast exam.    Recent surveillance mammography was reviewed, I personally interpreted the images and reviewed the report.  I discussed with the patient in detail and questions were addressed.  There are no suspicious findings.    Greater than 30 minutes was required for complete chart review, imaging review patient consultation and documentation            Past Medical and Surgical History  Allergies :   Penicillins    [email or web address]  Medical :   She has a past medical history of Colon cancer, GERD (gastroesophageal reflux disease), Hypertension, MAYNOR (iron deficiency anemia), and Port-A-Cath in place (10/29/2021).    Surgical :   She has a past surgical history that includes Mediport insertion, single; Laparoscopic partial colectomy (Right); Lip Surgery; Mediport removal; Mole removal; Colonoscopy (06/06/2022); and Colon surgery (09/09/2021).     Family History  Her family history includes Cancer in her maternal grandmother; Diabetes in her brother; Heart disease in her mother.    Social History  She reports that she has never smoked. She has never used smokeless tobacco. She reports that she does not currently use alcohol. She reports that she does not use drugs.      Review of Systems   Constitutional:  Negative for appetite change, chills, diaphoresis and fever.   HENT:  Negative for congestion, drooling, ear discharge, ear pain and hearing loss.    Eyes:  Negative for discharge.   Respiratory:  Negative for apnea, cough, choking, chest tightness, shortness of breath and stridor.    Cardiovascular:  Negative for chest pain, palpitations and leg swelling.   Endocrine: Negative for cold intolerance and heat intolerance.   Genitourinary:  Negative for difficulty urinating, dyspareunia, dysuria and hematuria.   Musculoskeletal:  Negative for arthralgias, gait problem and joint swelling.   Skin:  Negative for color change and rash.   Neurological:  Negative for dizziness, tremors, seizures, syncope, facial asymmetry, speech difficulty, light-headedness, numbness and headaches.   Psychiatric/Behavioral:  Negative for agitation and confusion.       Objective   Physical Exam  Vitals and nursing note reviewed.   Constitutional:       General: She is not in acute distress.     Appearance: Normal appearance. She is not ill-appearing, toxic-appearing or diaphoretic.   HENT:      Head: Normocephalic and atraumatic.      Right Ear: External ear normal.      Left Ear: External ear normal.      Nose: Nose normal.      Mouth/Throat:      Mouth: Mucous membranes are moist.      Pharynx: Oropharynx is clear.   Eyes:      General: No scleral icterus.     Extraocular Movements: Extraocular movements intact.      Conjunctiva/sclera: Conjunctivae normal.      Pupils: Pupils are equal, round, and reactive to light.   Cardiovascular:      Rate and Rhythm: Normal rate and regular rhythm.      Pulses: Normal pulses.      Heart sounds: No murmur heard.    No friction rub. No gallop.   Pulmonary:      Effort: Pulmonary effort is normal. No respiratory distress.      Breath sounds: Normal breath sounds. No stridor.   Abdominal:      General: Abdomen is flat. There is no distension.      Palpations:  "Abdomen is soft. There is no mass.      Tenderness: There is no abdominal tenderness. There is no right CVA tenderness, left CVA tenderness, guarding or rebound.      Hernia: No hernia is present.   Musculoskeletal:         General: No swelling, tenderness, deformity or signs of injury.      Cervical back: Normal range of motion and neck supple. No rigidity or tenderness.      Right lower leg: No edema.      Left lower leg: No edema.   Lymphadenopathy:      Cervical: No cervical adenopathy.   Skin:     General: Skin is warm.      Capillary Refill: Capillary refill takes less than 2 seconds.      Coloration: Skin is not jaundiced or pale.      Findings: No bruising, erythema, lesion or rash.   Neurological:      General: No focal deficit present.      Mental Status: She is alert and oriented to person, place, and time. Mental status is at baseline.      Cranial Nerves: No cranial nerve deficit.      Sensory: No sensory deficit.      Motor: No weakness.      Coordination: Coordination normal.      Gait: Gait normal.   Psychiatric:         Mood and Affect: Mood normal.         Behavior: Behavior normal.         Thought Content: Thought content normal.         Judgment: Judgment normal.     VITAL SIGNS: 24 HR MIN & MAX LAST    @FLOWSTAT(6:24::1)@           @FLOWSTAT(5:24::1)@  (!) 149/89     @FLOWSTAT(8:24::1)@  81     @FLOWSTAT(9:24::1)@       @FLOWSTAT(10:24::1)@         HT: 5' 4" (162.6 cm)  WT: 71.9 kg (158 lb 9.6 oz)  BMI: 27.2       Assessment & Plan     Stage III adenocarcinoma cecum status post laparoscopic/robotic right hemicolectomy September 2021, adjuvant chemotherapy    No evidence of disease  Continue colon cancer surveillance imaging per medical oncology protocol   Return to clinic in 1 year with routine screening mammography    Agustin Serra MD  Surgical Oncology  Complex General, Gastrointestinal and Hepatobiliary Surgery          "

## 2023-07-11 ENCOUNTER — LAB VISIT (OUTPATIENT)
Dept: LAB | Facility: HOSPITAL | Age: 58
End: 2023-07-11
Attending: INTERNAL MEDICINE
Payer: COMMERCIAL

## 2023-07-11 ENCOUNTER — OFFICE VISIT (OUTPATIENT)
Dept: INTERNAL MEDICINE | Facility: CLINIC | Age: 58
End: 2023-07-11
Payer: COMMERCIAL

## 2023-07-11 VITALS
HEART RATE: 71 BPM | TEMPERATURE: 98 F | BODY MASS INDEX: 27.1 KG/M2 | HEIGHT: 64 IN | WEIGHT: 158.75 LBS | DIASTOLIC BLOOD PRESSURE: 72 MMHG | SYSTOLIC BLOOD PRESSURE: 132 MMHG

## 2023-07-11 DIAGNOSIS — G62.9 NEUROPATHY: ICD-10-CM

## 2023-07-11 DIAGNOSIS — E55.9 VITAMIN D DEFICIENCY: ICD-10-CM

## 2023-07-11 DIAGNOSIS — Z00.00 HEALTHCARE MAINTENANCE: ICD-10-CM

## 2023-07-11 DIAGNOSIS — Z00.00 HEALTHCARE MAINTENANCE: Primary | ICD-10-CM

## 2023-07-11 LAB
ALBUMIN SERPL-MCNC: 4.4 G/DL (ref 3.5–5)
ALBUMIN/GLOB SERPL: 1.1 RATIO (ref 1.1–2)
ALP SERPL-CCNC: 151 UNIT/L (ref 40–150)
ALT SERPL-CCNC: 16 UNIT/L (ref 0–55)
AST SERPL-CCNC: 26 UNIT/L (ref 5–34)
BILIRUBIN DIRECT+TOT PNL SERPL-MCNC: 1.3 MG/DL
BUN SERPL-MCNC: 9.5 MG/DL (ref 9.8–20.1)
CALCIUM SERPL-MCNC: 10 MG/DL (ref 8.4–10.2)
CHLORIDE SERPL-SCNC: 104 MMOL/L (ref 98–107)
CHOLEST SERPL-MCNC: 236 MG/DL
CHOLEST/HDLC SERPL: 3 {RATIO} (ref 0–5)
CO2 SERPL-SCNC: 26 MMOL/L (ref 22–29)
CREAT SERPL-MCNC: 0.9 MG/DL (ref 0.55–1.02)
DEPRECATED CALCIDIOL+CALCIFEROL SERPL-MC: 52.2 NG/ML (ref 30–80)
EST. AVERAGE GLUCOSE BLD GHB EST-MCNC: 105.4 MG/DL
GFR SERPLBLD CREATININE-BSD FMLA CKD-EPI: >60 MLS/MIN/1.73/M2
GLOBULIN SER-MCNC: 4 GM/DL (ref 2.4–3.5)
GLUCOSE SERPL-MCNC: 82 MG/DL (ref 74–100)
HBA1C MFR BLD: 5.3 %
HBV CORE AB SERPL QL IA: NONREACTIVE
HBV SURFACE AG SERPL QL IA: NONREACTIVE
HCV AB SERPL QL IA: NONREACTIVE
HDLC SERPL-MCNC: 81 MG/DL (ref 35–60)
HIV 1+2 AB+HIV1 P24 AG SERPL QL IA: NONREACTIVE
LDLC SERPL CALC-MCNC: 143 MG/DL (ref 50–140)
POTASSIUM SERPL-SCNC: 4.4 MMOL/L (ref 3.5–5.1)
PROT SERPL-MCNC: 8.4 GM/DL (ref 6.4–8.3)
SODIUM SERPL-SCNC: 138 MMOL/L (ref 136–145)
TRIGL SERPL-MCNC: 58 MG/DL (ref 37–140)
TSH SERPL-ACNC: 1.22 UIU/ML (ref 0.35–4.94)
VLDLC SERPL CALC-MCNC: 12 MG/DL

## 2023-07-11 PROCEDURE — 99214 OFFICE O/P EST MOD 30 MIN: CPT | Mod: PBBFAC | Performed by: INTERNAL MEDICINE

## 2023-07-11 PROCEDURE — 80053 COMPREHEN METABOLIC PANEL: CPT

## 2023-07-11 PROCEDURE — 87389 HIV-1 AG W/HIV-1&-2 AB AG IA: CPT

## 2023-07-11 PROCEDURE — 87340 HEPATITIS B SURFACE AG IA: CPT

## 2023-07-11 PROCEDURE — 86704 HEP B CORE ANTIBODY TOTAL: CPT

## 2023-07-11 PROCEDURE — 86803 HEPATITIS C AB TEST: CPT

## 2023-07-11 PROCEDURE — 84443 ASSAY THYROID STIM HORMONE: CPT

## 2023-07-11 PROCEDURE — 80061 LIPID PANEL: CPT

## 2023-07-11 PROCEDURE — 82306 VITAMIN D 25 HYDROXY: CPT

## 2023-07-11 PROCEDURE — 36415 COLL VENOUS BLD VENIPUNCTURE: CPT

## 2023-07-11 PROCEDURE — 83036 HEMOGLOBIN GLYCOSYLATED A1C: CPT

## 2023-07-11 RX ORDER — AMLODIPINE BESYLATE 10 MG/1
10 TABLET ORAL DAILY
Qty: 30 TABLET | Refills: 2 | Status: SHIPPED | OUTPATIENT
Start: 2023-07-11 | End: 2023-09-01 | Stop reason: SDUPTHER

## 2023-07-11 RX ORDER — GABAPENTIN 800 MG/1
800 TABLET ORAL 3 TIMES DAILY
Qty: 90 TABLET | Refills: 4 | Status: SHIPPED | OUTPATIENT
Start: 2023-07-11 | End: 2023-08-29 | Stop reason: SDUPTHER

## 2023-07-11 RX ORDER — PANTOPRAZOLE SODIUM 40 MG/1
40 TABLET, DELAYED RELEASE ORAL DAILY
Qty: 90 TABLET | Refills: 1 | Status: SHIPPED | OUTPATIENT
Start: 2023-07-11 | End: 2024-03-05 | Stop reason: SDUPTHER

## 2023-07-11 NOTE — PROGRESS NOTES
[facility] INTERNAL MEDICINE  OUTPATIENT OFFICE VISIT NOTE    SUBJECTIVE:    HPI: Ami Smith is a 58 y.o. female w/ PMH of stage IIIb adenocarcinoma of cecum (diagnosed 7/20/21) s/p right hemicolectomy and adjuvant FOLFOX, who presents today to [facility] care.   She currently follows Oncology and is compliant with appointments.   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   Patient takes Norvasc 5 mg for hypertension.   Patient denies chest pain, palpitations, and shortness of breath.   Patient denies fever, night sweats, chills, nausea, vomiting, diarrhea, constipation, weight loss, and changes in appetite.    Allergies: None  PMH: as stated above  SxH: right hemicolectomy  SH: denies illicit drug use, alcohol use, tobacco use  Gyn: 2 kids, vaginal birth, last menstrual cycle 4 year ago.       Review of Systems:  Constitutional: No fever, No chills, No sweats, No weakness, No fatigue, No decreased activity.   Eye: No recent visual problem, No icterus, No double vision.  Ear/Nose/Mouth/Throat: No nasal congestion, No sore throat.   Respiratory: No shortness of breath, No cough, No sputum production, No hemoptysis,   No wheezing, No cyanosis.   Cardiovascular: No chest pain, No palpitations, No peripheral edema, No syncope.   Gastrointestinal: No nausea, No vomiting, No diarrhea, No constipation, No hematemesis.   Genitourinary: No dysuria, No hematuria, No change in urine stream, No urethral discharge.   Hematology/Lymphatics: No bruising tendency, No bleeding tendency.   Endocrine: No polyuria, No cold intolerance, No heat intolerance.   Immunologic: Not immunocompromised, No recurrent fevers.   Musculoskeletal: No joint pain, No claudication.   Integumentary: No rash, No pruritus, No abrasions, No petechiae.    Neurologic: Alert and oriented X4, No abnormal balance, No numbness, No tingling.   Psychiatric: No anxiety, no depression, Not suicidal, Not delusional, No  "hallucinations.     OBJECTIVE:    Vitals:   /72   Pulse 71   Temp 98.2 °F (36.8 °C)   Ht 5' 4.17" (1.63 m)   Wt 72 kg (158 lb 11.7 oz)   BMI 27.10 kg/m²      Physical Exam:  General: Alert and oriented, No acute distress.   Eye: Pupils are equal, round and reactive to light, Extraocular movements are intact,   Normal conjunctiva, Vision unchanged.   HENT: Normocephalic, Normal hearing, Oral mucosa is moist.   Neck: Supple, No carotid bruit, No jugular venous distention, No thyromegaly.   Respiratory: Lungs are clear to auscultation, Respirations are non-labored, Breath sounds   are equal, Symmetrical chest wall expansion, No chest wall tenderness.   Cardiovascular: Normal rate, Regular rhythm, No murmur, No gallop, Good pulses equal in   All extremities, Normal peripheral perfusion, No edema.   Gastrointestinal: Soft, Non-tender, Non-distended, Normal bowel sounds.   Genitourinary: No costovertebral angle tenderness, No suprapubic tenderness.   Musculoskeletal: Normal range of motion, Normal strength, No swelling, No deformity, Normal gait.   Integumentary: Warm, No pallor, No rash.   Neurologic: Alert, Oriented, Normal sensory, Normal motor function, No focal deficits,   Cranial Nerves II-XII are grossly intact, Monofilament examination: No sensory loss on plantar   or dorsal surface of feet bilaterally.   Psychiatric: Cooperative, Appropriate mood & affect.     Significant findings:      ASSESSMENT & PLAN:    Stage IIIb adenocarcinoma of cecum   Diagnosed 7/20/21  S/p right hemicolectomy and adjuvant FOLFOX  Currently follows Oncology, continue  CT abdomen and pelvis with contrast 02/2023 revealed no acute process seen or evidence of metastatic disease to chest, abdomen, and pelvis.   Repeat colonoscopy 06/2022  Due for another colonoscopy 06/2025    2.  Hypertension  Blood pressure 132/72  Currently on Norvasc 5 mg daily, increase to 10 mg daily.   Keep blood pressure less than 120/80.   Education: Low " salt diet and increase water intake. Exercise and weight loss.      3. Neuropathy  Was told it was due to side effects of  chemotherapy  Increased Gabapentin to 800 mg TID  If medication does not work, will switch to Lyrica.       Health Maintenance:  Breast cancer screening- mammogram 05/05/2023 revealed BI-RADS 1, next screening 05/2024    Return to clinic in 1 week to discuss lab work.     Lolita Salazar MD  Internal Medicine  [facility]

## 2023-07-18 ENCOUNTER — OFFICE VISIT (OUTPATIENT)
Dept: INTERNAL MEDICINE | Facility: CLINIC | Age: 58
End: 2023-07-18
Payer: COMMERCIAL

## 2023-07-18 VITALS
HEART RATE: 72 BPM | DIASTOLIC BLOOD PRESSURE: 82 MMHG | HEIGHT: 64 IN | WEIGHT: 158.75 LBS | TEMPERATURE: 98 F | BODY MASS INDEX: 27.1 KG/M2 | SYSTOLIC BLOOD PRESSURE: 136 MMHG

## 2023-07-18 DIAGNOSIS — I10 HYPERTENSION, ESSENTIAL, BENIGN: ICD-10-CM

## 2023-07-18 DIAGNOSIS — R74.8 ELEVATED ALKALINE PHOSPHATASE LEVEL: Primary | ICD-10-CM

## 2023-07-18 DIAGNOSIS — E78.5 HYPERLIPIDEMIA, UNSPECIFIED HYPERLIPIDEMIA TYPE: ICD-10-CM

## 2023-07-18 DIAGNOSIS — C18.2 MALIGNANT NEOPLASM OF ASCENDING COLON: ICD-10-CM

## 2023-07-18 PROCEDURE — 99213 OFFICE O/P EST LOW 20 MIN: CPT | Mod: PBBFAC | Performed by: INTERNAL MEDICINE

## 2023-07-18 NOTE — PROGRESS NOTES
[facility] INTERNAL MEDICINE  OUTPATIENT OFFICE VISIT NOTE    SUBJECTIVE:    HPI: Ami Smith is a 58 y.o. female w/ PMH of stage IIIb adenocarcinoma of cecum (diagnosed 7/20/21) s/p right hemicolectomy and adjuvant FOLFOX, who presents today tfor a follow up appointment to discuss lab work.   She currently follows Oncology and is compliant with appointments.   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   Patient takes Norvasc 5 mg for hypertension.   Patient denies chest pain, palpitations, and shortness of breath.   Patient denies fever, night sweats, chills, nausea, vomiting, diarrhea, constipation, weight loss, and changes in appetite.    Allergies: None  PMH: as stated above  SxH: right hemicolectomy  SH: denies illicit drug use, alcohol use, tobacco use  Gyn: 2 kids, vaginal birth, last menstrual cycle 4 year ago.       Review of Systems:  Constitutional: No fever, No chills, No sweats, No weakness, No fatigue, No decreased activity.   Eye: No recent visual problem, No icterus, No double vision.  Ear/Nose/Mouth/Throat: No nasal congestion, No sore throat.   Respiratory: No shortness of breath, No cough, No sputum production, No hemoptysis,   No wheezing, No cyanosis.   Cardiovascular: No chest pain, No palpitations, No peripheral edema, No syncope.   Gastrointestinal: No nausea, No vomiting, No diarrhea, No constipation, No hematemesis.   Genitourinary: No dysuria, No hematuria, No change in urine stream, No urethral discharge.   Hematology/Lymphatics: No bruising tendency, No bleeding tendency.   Endocrine: No polyuria, No cold intolerance, No heat intolerance.   Immunologic: Not immunocompromised, No recurrent fevers.   Musculoskeletal: No joint pain, No claudication.   Integumentary: No rash, No pruritus, No abrasions, No petechiae.    Neurologic: Alert and oriented X4, No abnormal balance, No numbness, No tingling.   Psychiatric: No anxiety, no depression, Not  "suicidal, Not delusional, No hallucinations.     OBJECTIVE:    Vitals:   /82   Pulse 72   Temp 97.9 °F (36.6 °C)   Ht 5' 4.17" (1.63 m)   Wt 72 kg (158 lb 11.7 oz)   BMI 27.10 kg/m²      Physical Exam:  General: Alert and oriented, No acute distress.   Eye: Pupils are equal, round and reactive to light, Extraocular movements are intact,   Normal conjunctiva, Vision unchanged.   HENT: Normocephalic, Normal hearing, Oral mucosa is moist.   Neck: Supple, No carotid bruit, No jugular venous distention, No thyromegaly.   Respiratory: Lungs are clear to auscultation, Respirations are non-labored, Breath sounds   are equal, Symmetrical chest wall expansion, No chest wall tenderness.   Cardiovascular: Normal rate, Regular rhythm, No murmur, No gallop, Good pulses equal in   All extremities, Normal peripheral perfusion, No edema.   Gastrointestinal: Soft, Non-tender, Non-distended, Normal bowel sounds.   Genitourinary: No costovertebral angle tenderness, No suprapubic tenderness.   Musculoskeletal: Normal range of motion, Normal strength, No swelling, No deformity, Normal gait.   Integumentary: Warm, No pallor, No rash.   Neurologic: Alert, Oriented, Normal sensory, Normal motor function, No focal deficits,   Cranial Nerves II-XII are grossly intact, Monofilament examination: No sensory loss on plantar   or dorsal surface of feet bilaterally.   Psychiatric: Cooperative, Appropriate mood & affect.     Significant findings:      ASSESSMENT & PLAN:    Stage IIIb adenocarcinoma of cecum   Diagnosed 7/20/21  S/p right hemicolectomy and adjuvant FOLFOX  Currently follows Oncology, continue  CT abdomen and pelvis with contrast 02/2023 revealed no acute process seen or evidence of metastatic disease to chest, abdomen, and pelvis.   Repeat colonoscopy 06/2022  Due for another colonoscopy 06/2025    2.  Hypertension  Blood pressure 136/82  Currently on Norvasc 5 mg daily, increased to 10 mg daily last appointment, but " reports has not start the 10 mg.   Keep blood pressure less than 120/80.   Education: Low salt diet and increase water intake. Exercise and weight loss.      3. Neuropathy  Was told it was due to side effects of  chemotherapy  Increased Gabapentin to 800 mg TID  If medication does not work, will switch to Lyrica.     4. Hyperlipidemia  , elevated  ACC  ASCVR Risk score 6.3%  Patient does want to try medication now  Begin Fish oil  Recheck in 3-4 months.     5. History of Vitamin D deficiency   Current Vitamin D 52.2  Continue daily Vitamin D     6. Elevated alkaline phosphatase   Alkaline phosphatase is 151  Will repeat to makes sure it decreases and quantify if needed.       Health Maintenance:  Breast cancer screening- mammogram 05/05/2023 revealed BI-RADS 1, next screening 05/2024    Return to clinic in 6 weeks for blood pressure evaluation.     Lolita Salazar MD  Internal Medicine  [facility]

## 2023-07-25 PROBLEM — I10 HYPERTENSION, ESSENTIAL, BENIGN: Status: ACTIVE | Noted: 2023-07-25

## 2023-07-25 PROBLEM — E78.5 HYPERLIPIDEMIA: Status: ACTIVE | Noted: 2023-07-25

## 2023-07-27 ENCOUNTER — DOCUMENTATION ONLY (OUTPATIENT)
Dept: ADMINISTRATIVE | Facility: HOSPITAL | Age: 58
End: 2023-07-27
Payer: COMMERCIAL

## 2023-08-29 ENCOUNTER — OFFICE VISIT (OUTPATIENT)
Dept: INTERNAL MEDICINE | Facility: CLINIC | Age: 58
End: 2023-08-29
Payer: COMMERCIAL

## 2023-08-29 VITALS
TEMPERATURE: 98 F | SYSTOLIC BLOOD PRESSURE: 127 MMHG | HEIGHT: 64 IN | WEIGHT: 158.75 LBS | HEART RATE: 64 BPM | DIASTOLIC BLOOD PRESSURE: 76 MMHG | BODY MASS INDEX: 27.1 KG/M2

## 2023-08-29 DIAGNOSIS — I10 HYPERTENSION, ESSENTIAL, BENIGN: ICD-10-CM

## 2023-08-29 DIAGNOSIS — E66.3 OVERWEIGHT (BMI 25.0-29.9): Primary | ICD-10-CM

## 2023-08-29 DIAGNOSIS — G62.9 NEUROPATHY: ICD-10-CM

## 2023-08-29 PROCEDURE — 99214 OFFICE O/P EST MOD 30 MIN: CPT | Mod: PBBFAC | Performed by: INTERNAL MEDICINE

## 2023-08-29 RX ORDER — GABAPENTIN 600 MG/1
600 TABLET ORAL 3 TIMES DAILY
Qty: 90 TABLET | Refills: 4 | Status: SHIPPED | OUTPATIENT
Start: 2023-08-29 | End: 2024-08-28

## 2023-08-29 RX ORDER — SEMAGLUTIDE 0.68 MG/ML
0.25 INJECTION, SOLUTION SUBCUTANEOUS
Qty: 1 EACH | Refills: 1 | Status: SHIPPED | OUTPATIENT
Start: 2023-08-29

## 2023-08-29 RX ORDER — GABAPENTIN 600 MG/1
600 TABLET ORAL 3 TIMES DAILY
Qty: 90 TABLET | Refills: 4 | Status: SHIPPED | OUTPATIENT
Start: 2023-08-29 | End: 2023-08-29 | Stop reason: SDUPTHER

## 2023-08-29 NOTE — PROGRESS NOTES
[facility] INTERNAL MEDICINE  OUTPATIENT OFFICE VISIT NOTE    SUBJECTIVE:    HPI: Ami Smith is a 58 y.o. female w/ PMH of stage IIIb adenocarcinoma of cecum (diagnosed 7/20/21) s/p right hemicolectomy and adjuvant FOLFOX, who presents today tfor a follow up appointment to discuss lab work.   She currently follows Oncology and is compliant with appointments.   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    Patient takes Norvasc 10 mg for hypertension. She denies any side effects from the medications.   Patient denies chest pain, palpitations, and shortness of breath.   Patient denies fever, night sweats, chills, nausea, vomiting, diarrhea, constipation, weight loss, and changes in appetite.    Allergies: None  PMH: as stated above  SxH: right hemicolectomy  SH: denies illicit drug use, alcohol use, tobacco use  Gyn: 2 kids, vaginal birth, last menstrual cycle 4 year ago.       Review of Systems:  Constitutional: No fever, No chills, No sweats, No weakness, No fatigue, No decreased activity.   Eye: No recent visual problem, No icterus, No double vision.  Ear/Nose/Mouth/Throat: No nasal congestion, No sore throat.   Respiratory: No shortness of breath, No cough, No sputum production, No hemoptysis,   No wheezing, No cyanosis.   Cardiovascular: No chest pain, No palpitations, No peripheral edema, No syncope.   Gastrointestinal: No nausea, No vomiting, No diarrhea, No constipation, No hematemesis.   Genitourinary: No dysuria, No hematuria, No change in urine stream, No urethral discharge.   Hematology/Lymphatics: No bruising tendency, No bleeding tendency.   Endocrine: No polyuria, No cold intolerance, No heat intolerance.   Immunologic: Not immunocompromised, No recurrent fevers.   Musculoskeletal: No joint pain, No claudication.   Integumentary: No rash, No pruritus, No abrasions, No petechiae.    Neurologic: Alert and oriented X4,  "No abnormal balance, No numbness, No tingling.   Psychiatric: No anxiety, no depression, Not suicidal, Not delusional, No hallucinations.     OBJECTIVE:    Vitals:   /76   Pulse 64   Temp 98.4 °F (36.9 °C)   Ht 5' 4.17" (1.63 m)   Wt 72 kg (158 lb 11.7 oz)   BMI 27.10 kg/m²      Physical Exam:  General: Alert and oriented, No acute distress.   Eye: Pupils are equal, round and reactive to light, Extraocular movements are intact,   Normal conjunctiva, Vision unchanged.   HENT: Normocephalic, Normal hearing, Oral mucosa is moist.   Neck: Supple, No carotid bruit, No jugular venous distention, No thyromegaly.   Respiratory: Lungs are clear to auscultation, Respirations are non-labored, Breath sounds   are equal, Symmetrical chest wall expansion, No chest wall tenderness.   Cardiovascular: Normal rate, Regular rhythm, No murmur, No gallop, Good pulses equal in   All extremities, Normal peripheral perfusion, No edema.   Gastrointestinal: Soft, Non-tender, Non-distended, Normal bowel sounds.   Genitourinary: No costovertebral angle tenderness, No suprapubic tenderness.   Musculoskeletal: Normal range of motion, Normal strength, No swelling, No deformity, Normal gait.   Integumentary: Warm, No pallor, No rash.   Neurologic: Alert, Oriented, Normal sensory, Normal motor function, No focal deficits,   Cranial Nerves II-XII are grossly intact, Monofilament examination: No sensory loss on plantar   or dorsal surface of feet bilaterally.   Psychiatric: Cooperative, Appropriate mood & affect.     Significant findings:      ASSESSMENT & PLAN:    Stage IIIb adenocarcinoma of cecum   Diagnosed 7/20/21  S/p right hemicolectomy and adjuvant FOLFOX  Currently follows Oncology, continue  CT abdomen and pelvis with contrast 02/2023 revealed no acute process seen or evidence of metastatic disease to chest, abdomen, and pelvis.   Repeat colonoscopy 06/2022  Due for another colonoscopy 06/2025    2.  Hypertension  Blood pressure " 127/76  Currently on Norvasc 10 mg daily, increased at last appointment.  Keep blood pressure less than 120/80.   Education: Low salt diet and increase water intake. Exercise and weight loss.    Will adjust medications at next visit if needed.     3. Neuropathy  Was told it was due to side effects of chemotherapy  Currently on Gabapentin to 800 mg TID, discontinue  Has broken medication in half due to being dizzy, 400 mg is tolerable.   Prescribed 600 mg TID.     4. Hyperlipidemia  , elevated  ACC  ASCVR Risk score 6.3%  Patient does want to try medication now  Begin Fish oil  Recheck in 3-4 months.     5. History of Vitamin D deficiency   Current Vitamin D 52.2  Continue daily Vitamin D     6. Elevated alkaline phosphatase- resolved   Alkaline phosphatase is 15, repeat is 143  Will monitor for elevation     7. Overweight with cardiovascular risk factor  BMI 27.10  Cardiovascular risk factor- Hypertension  Continue medications  Begin Ozempic, discussed family history of thyroid cancer and side effects. Patient states she understands and agrees.   Begin 0.25 mg qweekly for 4 weeks, then increase to 0.5 mg qweekly for 4 weeks.       Health Maintenance:  Breast cancer screening- mammogram 05/05/2023 revealed BI-RADS 1, next screening 05/2024  Cervical cancer screening- referral completed, she has to call back since she missed their call.     Return to clinic in 11 weeks for blood pressure evaluation.   Follow up in 7 weeks for weight check 10/17/23.    Lolita Salazar MD  Internal Medicine  [facility]

## 2023-09-05 RX ORDER — AMLODIPINE BESYLATE 10 MG/1
10 TABLET ORAL DAILY
Qty: 30 TABLET | Refills: 2 | Status: SHIPPED | OUTPATIENT
Start: 2023-09-05

## 2023-09-06 RX ORDER — ERGOCALCIFEROL 1.25 MG/1
CAPSULE ORAL
Status: CANCELLED | OUTPATIENT
Start: 2023-09-06

## 2023-09-06 NOTE — TELEPHONE ENCOUNTER
Dr Salazar,     Pt is also requesting a rx for Wegovy in place of Ozmepic. It is a weight loss med.    Please advise.

## 2023-11-01 ENCOUNTER — OFFICE VISIT (OUTPATIENT)
Dept: HEMATOLOGY/ONCOLOGY | Facility: CLINIC | Age: 58
End: 2023-11-01
Payer: COMMERCIAL

## 2023-11-01 VITALS
HEART RATE: 70 BPM | DIASTOLIC BLOOD PRESSURE: 79 MMHG | TEMPERATURE: 98 F | WEIGHT: 156.31 LBS | OXYGEN SATURATION: 99 % | SYSTOLIC BLOOD PRESSURE: 149 MMHG | BODY MASS INDEX: 26.69 KG/M2 | HEIGHT: 64 IN

## 2023-11-01 DIAGNOSIS — T45.1X5A NEUROPATHY DUE TO CHEMOTHERAPEUTIC DRUG: ICD-10-CM

## 2023-11-01 DIAGNOSIS — G62.0 NEUROPATHY DUE TO CHEMOTHERAPEUTIC DRUG: ICD-10-CM

## 2023-11-01 DIAGNOSIS — Z85.038 HISTORY OF COLON CANCER, STAGE III: Primary | ICD-10-CM

## 2023-11-01 PROCEDURE — 99213 OFFICE O/P EST LOW 20 MIN: CPT | Mod: S$GLB,,, | Performed by: NURSE PRACTITIONER

## 2023-11-01 PROCEDURE — 1159F PR MEDICATION LIST DOCUMENTED IN MEDICAL RECORD: ICD-10-PCS | Mod: CPTII,S$GLB,, | Performed by: NURSE PRACTITIONER

## 2023-11-01 PROCEDURE — 99999 PR PBB SHADOW E&M-EST. PATIENT-LVL III: CPT | Mod: PBBFAC,,, | Performed by: NURSE PRACTITIONER

## 2023-11-01 PROCEDURE — 3044F HG A1C LEVEL LT 7.0%: CPT | Mod: CPTII,S$GLB,, | Performed by: NURSE PRACTITIONER

## 2023-11-01 PROCEDURE — 3008F BODY MASS INDEX DOCD: CPT | Mod: CPTII,S$GLB,, | Performed by: NURSE PRACTITIONER

## 2023-11-01 PROCEDURE — 3077F SYST BP >= 140 MM HG: CPT | Mod: CPTII,S$GLB,, | Performed by: NURSE PRACTITIONER

## 2023-11-01 PROCEDURE — 99999 PR PBB SHADOW E&M-EST. PATIENT-LVL III: ICD-10-PCS | Mod: PBBFAC,,, | Performed by: NURSE PRACTITIONER

## 2023-11-01 PROCEDURE — 3077F PR MOST RECENT SYSTOLIC BLOOD PRESSURE >= 140 MM HG: ICD-10-PCS | Mod: CPTII,S$GLB,, | Performed by: NURSE PRACTITIONER

## 2023-11-01 PROCEDURE — 3044F PR MOST RECENT HEMOGLOBIN A1C LEVEL <7.0%: ICD-10-PCS | Mod: CPTII,S$GLB,, | Performed by: NURSE PRACTITIONER

## 2023-11-01 PROCEDURE — 3078F PR MOST RECENT DIASTOLIC BLOOD PRESSURE < 80 MM HG: ICD-10-PCS | Mod: CPTII,S$GLB,, | Performed by: NURSE PRACTITIONER

## 2023-11-01 PROCEDURE — 1159F MED LIST DOCD IN RCRD: CPT | Mod: CPTII,S$GLB,, | Performed by: NURSE PRACTITIONER

## 2023-11-01 PROCEDURE — 1160F RVW MEDS BY RX/DR IN RCRD: CPT | Mod: CPTII,S$GLB,, | Performed by: NURSE PRACTITIONER

## 2023-11-01 PROCEDURE — 99213 PR OFFICE/OUTPT VISIT, EST, LEVL III, 20-29 MIN: ICD-10-PCS | Mod: S$GLB,,, | Performed by: NURSE PRACTITIONER

## 2023-11-01 PROCEDURE — 3078F DIAST BP <80 MM HG: CPT | Mod: CPTII,S$GLB,, | Performed by: NURSE PRACTITIONER

## 2023-11-01 PROCEDURE — 1160F PR REVIEW ALL MEDS BY PRESCRIBER/CLIN PHARMACIST DOCUMENTED: ICD-10-PCS | Mod: CPTII,S$GLB,, | Performed by: NURSE PRACTITIONER

## 2023-11-01 PROCEDURE — 3008F PR BODY MASS INDEX (BMI) DOCUMENTED: ICD-10-PCS | Mod: CPTII,S$GLB,, | Performed by: NURSE PRACTITIONER

## 2023-11-01 RX ORDER — PREGABALIN 100 MG/1
100 CAPSULE ORAL 2 TIMES DAILY
Qty: 60 CAPSULE | Refills: 1 | Status: SHIPPED | OUTPATIENT
Start: 2023-11-01 | End: 2024-02-12

## 2023-11-01 NOTE — PROGRESS NOTES
Heme/Onc Progress Note    PATIENT: Ami Smith  MRN: [id number]  DATE: 11/1/2023  Chief Complaint: Follow-up (Patient is here for her 6 month visit) and Numbness (Still have numbness in feet, concerns of her falling)    Diagnosis:   Stage IIIb (pT3N1b Adenocarcinoma of cecum). Diagnosed 7/20/21     Current Treatment: Observation     Treatment History:   9/9/21:Right hemicolectomy   11/1/21- 4/7/22: Adjuvant FOLFOX (No 5FU bolus)     Tumor Site: Cecum   Histologic Type: Adenocarcinoma   Histologic Grade: G2: Moderately differentiated   Tumor Size: 6 Centimeters (cm)   Tumor Extension: Tumor invades through the muscularis propria into pericolorectal tissue   Macroscopic Tumor Perforation: Not identified   Lymphovascular Invasion: Indeterminate.   Perineural Invasion: Not identified   Treatment Effect: No known presurgical therapy   Margins   Margins: All margins are uninvolved by invasive carcinoma, high grade dysplasia / intramucosal carcinoma, and low grade dysplasia   Margins Examined: Proximal; Distal; Radial (circumferential) or Mesenteric   Distance of Invasive Carcinoma from Closest Margin: 5 cm   Closest Margin: Radial (circumferential) or Mesenteric   Distance of Tumor from Radial (circumferential) Margin: 5 cm   Lymph Nodes   Number of Lymph Nodes Involved: 2   Number of Lymph Nodes Examined: 36   Tumor Deposits: Not identified   Pathologic Stage Classification   Primary Tumor: pT3   Regional Lymph Nodes: pN1b   Immunohistochemical stains for MLH1, MSH2, MSH6, and PMS2 are intact and normal. There is no evidence of DNA mismatch repair deficiency. Indicating the tumor is microsatellite stable, and HNP CC is very unlikely   Clinical History:   This patient presented to her PCP in July 2021 with a 3 week history of rectal bleeding and 15 lb weight loss. She was found to have iron deficiency anemia and was referred for colonoscopy.   7/20/21: Colonoscopy showed a 4 cm infiltrative, ulcerative  fungating mass in the cecum and proximal ascending colon. Pathology revealed fragments of adenocarcinoma, moderately differentiated. CT abd/pel was performed showing thickening of the cecal wall with local adenopathy. Pre op CEA 3.2     7/29/2021: CT scan abdomen pelvis cecal wall thickening with adjacent fatty infiltration adenopathy consistent with history of cecal neoplasm, diffuse fatty liver, angiomyolipoma of the right kidney, left renal cyst     8/19/21: CT chest-unremarkable.   She underwent right hemicolectomy and is referred to us for further treatment recommendations.     4/24/23: CT CAP-No acute process seen or evidence of metastatic disease to the chest abdomen or pelvis.         11/01/2023  Patient presents for follow up for her colon cancer. She is doing well overall,  She still has some neuropathy to the  bottoms of her feet. She takes gabapentin 600mg TID, however not effective and makes her feel dizzy.   Her CBC is unremarkable, CMP and CEA pending. normal.  No abdominal pain, N/V/D or change in bowel habits. Occasional constipation.   Past Medical History:   Diagnosis Date    Colon cancer     GERD (gastroesophageal reflux disease)     Hypertension     MAYNOR (iron deficiency anemia)     Port-A-Cath in place 10/29/2021    Dr. Serra        Current Outpatient Medications:     amLODIPine (NORVASC) 10 MG tablet, Take 1 tablet (10 mg total) by mouth once daily., Disp: 30 tablet, Rfl: 2    gabapentin (NEURONTIN) 600 MG tablet, Take 1 tablet (600 mg total) by mouth 3 (three) times daily. (Patient taking differently: Take 600 mg by mouth 2 (two) times daily.), Disp: 90 tablet, Rfl: 4    IBUPROFEN ORAL, Take 50 mg by mouth., Disp: , Rfl:     meclizine (ANTIVERT) 25 MG tablet, Take 25 mg by mouth 3 (three) times daily as needed., Disp: , Rfl:     pantoprazole (PROTONIX) 40 MG tablet, Take 1 tablet (40 mg total) by mouth once daily., Disp: 90 tablet, Rfl: 1    polyethylene glycol (GLYCOLAX) 17 gram/dose powder,  Take 17 g by mouth., Disp: , Rfl:     semaglutide (OZEMPIC) 0.25 mg or 0.5 mg (2 mg/3 mL) pen injector, Inject 0.25 mg into the skin every 7 days., Disp: 1 each, Rfl: 1    pregabalin (LYRICA) 100 MG capsule, Take 1 capsule (100 mg total) by mouth 2 (two) times daily., Disp: 60 capsule, Rfl: 1     Review of Systems:   Pertinent positives and negatives included in the HPI. Otherwise a complete review of   systems is negative.  Review of Systems       Objective:     Vitals:    11/01/23 1057   BP: (!) 149/79   Pulse: 70   Temp: 98.1 °F (36.7 °C)        Physical Exam  Constitutional:       Appearance: Normal appearance.   HENT:      Head: Normocephalic and atraumatic.      Nose: Nose normal.      Mouth/Throat:      Mouth: Mucous membranes are moist.   Eyes:      Extraocular Movements: Extraocular movements intact.      Conjunctiva/sclera: Conjunctivae normal.      Pupils: Pupils are equal, round, and reactive to light.   Cardiovascular:      Rate and Rhythm: Normal rate and regular rhythm.      Pulses: Normal pulses.   Pulmonary:      Effort: Pulmonary effort is normal.      Breath sounds: Normal breath sounds.   Abdominal:      General: Bowel sounds are normal.      Palpations: Abdomen is soft.   Musculoskeletal:      Cervical back: Normal range of motion and neck supple.   Neurological:      General: No focal deficit present.      Mental Status: She is alert and oriented to person, place, and time. Mental status is at baseline.   Psychiatric:         Mood and Affect: Mood normal.         Behavior: Behavior normal.         Assessment and Plan   Stage IIIB (pT3, Pn1b, cM0) neoplasm of ascending colon. Diagnosed 7/20/2021. Completed adjuvant chemotherapy 4/7/22.  Peripheral Neuropathy-gabapentin 600mg TID not effective. Taper off over several weeks, Take gabapentin 600mg in am and then start lyrica 100mg in pm, then eventually Lyrica only 100mg BID    PLAN:  Repeat colonoscopy--Dr. Hernadez was done 6/2022 (repeat  6/2025)  History and PE every 3-6 m X 2 y, then q 6 m for a total of 5 y  CT CAP q 6-12 m (category 2B for frequency <12 months) from date of surgery for a total of 5 y.  Repeat scans in 1 year (4/2024)  Colonoscopy in 1 y after surgery except if no preoperative colonoscopy due to obstructing lesion, colonoscopy in 3-6 mo.        Follow up in about 3 months (around 2/1/2024) for CBC, CMP, CEA and OV with Ruth.

## 2023-11-29 NOTE — PROGRESS NOTES
Procedure note    Date: 5/18/22    Surgeon:  Agustin Serra MD                                       Pre-op Diagnosis:  Left wrist skin lesion, colon cancer with mediport in place    Post-op Diagnosis:  Same    Procedure:  Mediport removal, excision left wrist skin lesion (3cm ellipse)    Anesthesia:  Local    EBL:  10 cc    Findings:  MediPort was removed, catheter was intact. Skin lesion was completely excised    Procedure in detail:  After informed consent the patient was brought to the minor procedure room placed in the supine position.  The upper chest neck, and left wrist were prepped and draped in sterile fashion.  After infiltration with local anesthesia the previous MediPort incision was opened with a scalpel and dissection was carried down sharply to the MediPort capsule which was incised.  The port was extracted and examined, the catheter was intact.  The catheter tract was oversewn with 3-0 Vicryl suture.  Meticulous hemostasis was achieved.  The wound was closed in multiple layers with absorbable suture and sterile dressings were applied. Attention was turned to the left wrist, an elliptical incision was made around the lesion and it was completely excised sharply down to the level of the subcutaneous tissue with a scalpel. Pressure was held for hemostasis and the wound was closed in a single layer with absorbable suture. Sterile dressings applied.     Agustin Serra MD  Surgical Oncology  Complex General, Gastrointestinal and Hepatobiliary Surgery

## 2023-11-29 NOTE — PROGRESS NOTES
Chief complaint: Left wrist skin lesion, mediport removal     HPI: 59 y/o F w history of colon cancer, presents for mediport removal and c/o left wrist skin lesion that has enlarged.        Past Medical and Surgical History  Allergies :   Penicillins    [facility]  Medical :   She has a past medical history of Colon cancer, GERD (gastroesophageal reflux disease), Hypertension, MAYNOR (iron deficiency anemia), and Port-A-Cath in place (10/29/2021).    Surgical :   She has a past surgical history that includes Mediport insertion, single; Laparoscopic partial colectomy (Right); Lip Surgery; Mediport removal; Mole removal; Colonoscopy (06/06/2022); Colon surgery (09/09/2021); and Colonoscopy (07/28/2021).     Family History  Her family history includes Cancer in her maternal grandmother; Diabetes in her brother; Heart disease in her mother.    Social History  She reports that she has never smoked. She has never used smokeless tobacco. She reports that she does not currently use alcohol. She reports that she does not use drugs.     Review of Systems   Constitutional:  Negative for appetite change, chills, diaphoresis and fever.   HENT:  Negative for congestion, drooling, ear discharge, ear pain and hearing loss.    Eyes:  Negative for discharge.   Respiratory:  Negative for apnea, cough, choking, chest tightness, shortness of breath and stridor.    Cardiovascular:  Negative for chest pain, palpitations and leg swelling.   Endocrine: Negative for cold intolerance and heat intolerance.   Genitourinary:  Negative for difficulty urinating, dyspareunia, dysuria and hematuria.   Musculoskeletal:  Negative for arthralgias, gait problem and joint swelling.   Skin:  Negative for color change and rash.   Neurological:  Negative for dizziness, tremors, seizures, syncope, facial asymmetry, speech difficulty, light-headedness, numbness and headaches.   Psychiatric/Behavioral:  Negative for agitation and confusion.         Objective    Physical Exam  Vitals and nursing note reviewed.   Constitutional:       General: She is not in acute distress.     Appearance: Normal appearance. She is not ill-appearing, toxic-appearing or diaphoretic.   HENT:      Head: Normocephalic and atraumatic.      Right Ear: External ear normal.      Left Ear: External ear normal.      Nose: Nose normal.      Mouth/Throat:      Mouth: Mucous membranes are moist.      Pharynx: Oropharynx is clear.   Eyes:      General: No scleral icterus.     Extraocular Movements: Extraocular movements intact.      Conjunctiva/sclera: Conjunctivae normal.      Pupils: Pupils are equal, round, and reactive to light.   Cardiovascular:      Rate and Rhythm: Normal rate and regular rhythm.      Pulses: Normal pulses.      Heart sounds: No murmur heard.     No friction rub. No gallop.   Pulmonary:      Effort: Pulmonary effort is normal. No respiratory distress.      Breath sounds: Normal breath sounds. No stridor.   Abdominal:      General: Abdomen is flat. There is no distension.      Palpations: Abdomen is soft. There is no mass.      Tenderness: There is no abdominal tenderness. There is no right CVA tenderness, left CVA tenderness, guarding or rebound.      Hernia: No hernia is present.   Musculoskeletal:         General: No swelling, tenderness, deformity or signs of injury.      Cervical back: Normal range of motion and neck supple. No rigidity or tenderness.      Right lower leg: No edema.      Left lower leg: No edema.   Lymphadenopathy:      Cervical: No cervical adenopathy.   Skin:     General: Skin is warm.      Capillary Refill: Capillary refill takes less than 2 seconds.      Coloration: Skin is not jaundiced or pale.      Findings: Lesion present. No bruising, erythema or rash.      Comments: Left wrist skin lesion, 2cm   Neurological:      General: No focal deficit present.      Mental Status: She is alert and oriented to person, place, and time. Mental status is at baseline.       Cranial Nerves: No cranial nerve deficit.      Sensory: No sensory deficit.      Motor: No weakness.      Coordination: Coordination normal.      Gait: Gait normal.   Psychiatric:         Mood and Affect: Mood normal.         Behavior: Behavior normal.         Thought Content: Thought content normal.         Judgment: Judgment normal.       VITAL SIGNS: 24 HR MIN & MAX LAST    @FLOWSTAT(6:24::1)@           @FLOWSTAT(5:24::1)@  (!) 176/84     @FLOWSTAT(8:24::1)@  88     @FLOWSTAT(9:24::1)@       @FLOWSTAT(10:24::1)@         HT:    WT: 73.5 kg (162 lb)  BMI:         Assessment & Plan     Mediport removal and excision left wrist skin lesion in minor surgery

## 2023-11-30 ENCOUNTER — OFFICE VISIT (OUTPATIENT)
Dept: URGENT CARE | Facility: CLINIC | Age: 58
End: 2023-11-30
Payer: COMMERCIAL

## 2023-11-30 VITALS
HEIGHT: 64 IN | SYSTOLIC BLOOD PRESSURE: 156 MMHG | DIASTOLIC BLOOD PRESSURE: 73 MMHG | HEART RATE: 74 BPM | OXYGEN SATURATION: 100 % | TEMPERATURE: 99 F | WEIGHT: 158.31 LBS | BODY MASS INDEX: 27.03 KG/M2 | RESPIRATION RATE: 17 BRPM

## 2023-11-30 DIAGNOSIS — B37.0 ORAL THRUSH: Primary | ICD-10-CM

## 2023-11-30 DIAGNOSIS — R68.89 FLU-LIKE SYMPTOMS: ICD-10-CM

## 2023-11-30 DIAGNOSIS — G56.02 CARPAL TUNNEL SYNDROME OF LEFT WRIST: ICD-10-CM

## 2023-11-30 LAB
CTP QC/QA: YES
FLUAV AG UPPER RESP QL IA.RAPID: NOT DETECTED
FLUBV AG UPPER RESP QL IA.RAPID: NOT DETECTED
MOLECULAR STREP A: NEGATIVE
RSV A 5' UTR RNA NPH QL NAA+PROBE: NOT DETECTED
SARS-COV-2 RNA RESP QL NAA+PROBE: NOT DETECTED

## 2023-11-30 PROCEDURE — 99213 OFFICE O/P EST LOW 20 MIN: CPT | Mod: S$PBB,,,

## 2023-11-30 PROCEDURE — 0241U COVID/RSV/FLU A&B PCR: CPT

## 2023-11-30 PROCEDURE — 99214 OFFICE O/P EST MOD 30 MIN: CPT | Mod: PBBFAC

## 2023-11-30 PROCEDURE — 87651 STREP A DNA AMP PROBE: CPT | Mod: PBBFAC

## 2023-11-30 PROCEDURE — 63600175 PHARM REV CODE 636 W HCPCS

## 2023-11-30 PROCEDURE — 99213 PR OFFICE/OUTPT VISIT, EST, LEVL III, 20-29 MIN: ICD-10-PCS | Mod: S$PBB,,,

## 2023-11-30 RX ORDER — DEXAMETHASONE SODIUM PHOSPHATE 4 MG/ML
8 INJECTION, SOLUTION INTRA-ARTICULAR; INTRALESIONAL; INTRAMUSCULAR; INTRAVENOUS; SOFT TISSUE
Status: COMPLETED | OUTPATIENT
Start: 2023-11-30 | End: 2023-11-30

## 2023-11-30 RX ORDER — NYSTATIN 100000 [USP'U]/ML
6 SUSPENSION ORAL 4 TIMES DAILY
Qty: 336 EACH | Refills: 0 | Status: SHIPPED | OUTPATIENT
Start: 2023-11-30 | End: 2023-12-14

## 2023-11-30 RX ORDER — DEXAMETHASONE SODIUM PHOSPHATE 4 MG/ML
8 INJECTION, SOLUTION INTRA-ARTICULAR; INTRALESIONAL; INTRAMUSCULAR; INTRAVENOUS; SOFT TISSUE
Status: DISCONTINUED | OUTPATIENT
Start: 2023-11-30 | End: 2023-11-30

## 2023-11-30 RX ADMIN — DEXAMETHASONE SODIUM PHOSPHATE 8 MG: 4 INJECTION, SOLUTION INTRA-ARTICULAR; INTRALESIONAL; INTRAMUSCULAR; INTRAVENOUS; SOFT TISSUE at 09:11

## 2023-11-30 NOTE — LETTER
November 30, 2023      [facility]  [facility] [facility] [address]  Phone: [phone]       Patient: Ami Smith   YOB: 1965  Date of Visit: 11/30/2023    To Whom It May Concern:    Clemencia Smith  was at [facility] on 11/30/2023. The patient may return to work/school on 12/01/2023 with no restrictions. If you have any questions or concerns, or if I can be of further assistance, please do not hesitate to contact me.    Sincerely,      BRYAN Moralez      Pt presented to the ED with c/o chest pain x3 weeks on and off. PT also states chest pain is accompanied by neck pain that is constant.  Pt also states he suffers from acid reflux. PT states the chest pain woke him out of his sleep tonight.  pt was seen in [facility] yesterday for the same symptoms.  PT was cleared. Pt presented to the ED with c/o chest pain x3 weeks on and off. PT also states chest pain is accompanied by neck pain that is constant.  Pt also states he suffers from acid reflux. PT states the chest pain woke him out of his sleep tonight.  pt was seen in [facility] yesterday for the same symptoms.  PT was cleared.  Pt has pmhx of asthma and on cymbacort.

## 2023-12-01 NOTE — NURSING
Dexamethasone 8 mg administed to Right Gluteal using aseptic technique. Patient observed for 15 minutes for reactions, none noted. Patient tolerated well.

## 2023-12-01 NOTE — PROGRESS NOTES
"Subjective:       Patient ID: Ami Smith is a 58 y.o. female.    Vitals:  height is 5' 4.17" (1.63 m) and weight is 71.8 kg (158 lb 4.8 oz). Her temperature is 98.8 °F (37.1 °C). Her blood pressure is 156/73 (abnormal) and her pulse is 74. Her respiration is 17 and oxygen saturation is 100%.     Chief Complaint: Blister (Pt reports that it hurts when she eats states that it burns, sore throat, "my mouth feels very irritated".) and Wrist Pain (Left, pt denies any injuries. Startes that this has occurred before)    57 y/o AA female complains of left wrist pain and swelling intermittent x months. Denies any injury or falls. Reports she is ambidextrous and works heavily with her hands. Also reports throat pain, white tongue, with mouth burning sensation. Recently had tooth removed.     Wrist Pain   Pertinent negatives include no fever.       Constitution: Negative for fever.   HENT:  Positive for tongue pain and sore throat. Negative for mouth sores, tongue lesion and trouble swallowing.    Musculoskeletal:  Positive for pain, joint pain and joint swelling. Negative for arthritis.       Objective:      Physical Exam   Constitutional: She is oriented to person, place, and time. She is cooperative. awake  HENT:   Head: Normocephalic and atraumatic.   Mouth/Throat: Oropharynx is clear and moist and mucous membranes are normal. Abnormal dentition. Dental caries present. Tonsils are 1+ on the right. Tonsils are 1+ on the left. No tonsillar exudate.   Tongue has thick white film, top palate appears irritated and red.       Comments: Tongue has thick white film, top palate appears irritated and red.   Abdominal: Normal appearance.   Musculoskeletal:      Left hand: She exhibits normal capillary refill. Decreased sensation noted. Decreased sensation is present in the medial distribution and is present in the radial distribution. Decreased sensation is not present in the ulnar distribution. Motor /Testing: Wrist Extension: " 4/5. Wrist Flexion: 4/5. Testing: Phalen's sign at left wrist. Tinel's sign at left wrist.      Comments: Left wrist swelling on medial side. No erythema or warmth, full ROM of all digits.    Neurological: no focal deficit. She is alert and oriented to person, place, and time. GCS eye subscore is 4. GCS verbal subscore is 5. GCS motor subscore is 6.   Skin: Skin is warm, dry and intact.   Psychiatric: Her behavior is normal.   Nursing note and vitals reviewed.        Assessment:       1. Oral thrush    2. Flu-like symptoms    3. Carpal tunnel syndrome of left wrist          Plan:     Will treat for thrush with 14 days of Nystatin. Wrist splint applied, wear at night to help with inflammation of median nerve. Follow up with PCP if symptoms worsen or persist. May take Naproxen or Ibuprofen for pain relief.     Oral thrush  -     nystatin (MYCOSTATIN) 100,000 unit/mL suspension; Take 6 mLs (600,000 Units total) by mouth 4 (four) times daily. for 14 days  Dispense: 336 each; Refill: 0    Flu-like symptoms  -     COVID/RSV/FLU A&B PCR; Future; Expected date: 11/30/2023  -     POCT Strep A, Molecular    Carpal tunnel syndrome of left wrist    Other orders  -     Discontinue: dexAMETHasone injection 8 mg  -     dexAMETHasone injection 8 mg

## 2023-12-01 NOTE — PROGRESS NOTES
"Subjective:      Patient ID: Ami Smith is a 58 y.o. female.    Vitals:  height is 5' 4.17" (1.63 m) and weight is 71.8 kg (158 lb 4.8 oz). Her temperature is 98.8 °F (37.1 °C). Her blood pressure is 156/73 (abnormal) and her pulse is 74. Her respiration is 17 and oxygen saturation is 100%.     Chief Complaint: Blister (Pt reports that it hurts when she eats states that it burns, sore throat, "my mouth feels very irritated".) and Wrist Pain (Left, pt denies any injuries. Startes that this has occurred before)    HPI  ROS   Objective:     Physical Exam    Assessment:     1. Flu-like symptoms        Plan:       Flu-like symptoms  -     COVID/RSV/FLU A&B PCR; Future; Expected date: 11/30/2023  -     POCT Strep A, Molecular                    "

## 2024-02-08 ENCOUNTER — TELEPHONE (OUTPATIENT)
Dept: INTERNAL MEDICINE | Facility: CLINIC | Age: 59
End: 2024-02-08
Payer: COMMERCIAL

## 2024-02-08 NOTE — TELEPHONE ENCOUNTER
----- Message from Emma Patiño sent at 2/8/2024  3:13 PM CST -----  Regarding: refill  Patient needs the following prescription refilled; amLODIPine (NORVASC) 10 MG, gabapentin (NEURONTIN) 600 MG, and emaglutide (OZEMPIC) 0.25 mg

## 2024-02-11 DIAGNOSIS — T45.1X5A NEUROPATHY DUE TO CHEMOTHERAPEUTIC DRUG: ICD-10-CM

## 2024-02-11 DIAGNOSIS — G62.0 NEUROPATHY DUE TO CHEMOTHERAPEUTIC DRUG: ICD-10-CM

## 2024-02-12 RX ORDER — PREGABALIN 100 MG/1
100 CAPSULE ORAL 2 TIMES DAILY
Qty: 60 CAPSULE | Refills: 1 | Status: SHIPPED | OUTPATIENT
Start: 2024-02-12 | End: 2024-05-28

## 2024-03-05 DIAGNOSIS — E66.3 OVERWEIGHT (BMI 25.0-29.9): Primary | ICD-10-CM

## 2024-03-06 RX ORDER — PANTOPRAZOLE SODIUM 40 MG/1
40 TABLET, DELAYED RELEASE ORAL DAILY
Qty: 90 TABLET | Refills: 1 | Status: SHIPPED | OUTPATIENT
Start: 2024-03-06

## 2024-03-20 ENCOUNTER — TELEPHONE (OUTPATIENT)
Dept: INTERNAL MEDICINE | Facility: CLINIC | Age: 59
End: 2024-03-20
Payer: COMMERCIAL

## 2024-03-20 NOTE — TELEPHONE ENCOUNTER
----- Message from Hao August LPN sent at 2/29/2024  9:48 AM CST -----  Regarding: FW: dr. Salazar  Can you please check into this, thanks  ----- Message -----  From: Emma Patiño  Sent: 2/28/2024   3:01 PM CST  To: #  Subject: dr. Salazar                                       Patient came in and was requesting that her Dr to prescribe her Major Peg 3350 I inform her to schedule an appointment as well.

## 2024-05-08 DIAGNOSIS — I10 HYPERTENSION, ESSENTIAL, BENIGN: ICD-10-CM

## 2024-05-08 DIAGNOSIS — E66.3 OVERWEIGHT (BMI 25.0-29.9): ICD-10-CM

## 2024-05-10 ENCOUNTER — HOSPITAL ENCOUNTER (OUTPATIENT)
Dept: RADIOLOGY | Facility: HOSPITAL | Age: 59
Discharge: HOME | End: 2024-05-10
Attending: SURGERY
Payer: COMMERCIAL

## 2024-05-10 DIAGNOSIS — Z12.31 VISIT FOR SCREENING MAMMOGRAM: ICD-10-CM

## 2024-05-10 PROCEDURE — 77063 BREAST TOMOSYNTHESIS BI: CPT | Mod: 26,,, | Performed by: RADIOLOGY

## 2024-05-10 PROCEDURE — 77067 SCR MAMMO BI INCL CAD: CPT | Mod: 26,,, | Performed by: RADIOLOGY

## 2024-05-10 PROCEDURE — 77067 SCR MAMMO BI INCL CAD: CPT | Mod: TC

## 2024-05-10 RX ORDER — SEMAGLUTIDE 0.68 MG/ML
0.25 INJECTION, SOLUTION SUBCUTANEOUS
Qty: 1 EACH | Refills: 1 | Status: SHIPPED | OUTPATIENT
Start: 2024-05-10

## 2024-05-14 DIAGNOSIS — I10 HYPERTENSION, ESSENTIAL, BENIGN: Primary | ICD-10-CM

## 2024-05-14 RX ORDER — AMLODIPINE BESYLATE 10 MG/1
10 TABLET ORAL DAILY
Qty: 30 TABLET | Refills: 2 | Status: SHIPPED | OUTPATIENT
Start: 2024-05-14

## 2024-05-15 ENCOUNTER — OFFICE VISIT (OUTPATIENT)
Dept: SURGICAL ONCOLOGY | Facility: CLINIC | Age: 59
End: 2024-05-15
Payer: COMMERCIAL

## 2024-05-15 VITALS — WEIGHT: 181 LBS | BODY MASS INDEX: 30.9 KG/M2 | HEART RATE: 88 BPM | HEIGHT: 64 IN

## 2024-05-15 DIAGNOSIS — Z12.31 VISIT FOR SCREENING MAMMOGRAM: Primary | ICD-10-CM

## 2024-05-15 PROCEDURE — G2211 COMPLEX E/M VISIT ADD ON: HCPCS | Mod: S$GLB,,, | Performed by: SURGERY

## 2024-05-15 PROCEDURE — 1159F MED LIST DOCD IN RCRD: CPT | Mod: CPTII,S$GLB,, | Performed by: SURGERY

## 2024-05-15 PROCEDURE — 3008F BODY MASS INDEX DOCD: CPT | Mod: CPTII,S$GLB,, | Performed by: SURGERY

## 2024-05-15 PROCEDURE — 99999 PR PBB SHADOW E&M-EST. PATIENT-LVL III: CPT | Mod: PBBFAC,,, | Performed by: SURGERY

## 2024-05-15 PROCEDURE — 99214 OFFICE O/P EST MOD 30 MIN: CPT | Mod: S$GLB,,, | Performed by: SURGERY

## 2024-05-15 NOTE — PROGRESS NOTES
Chief complaint:  Colon cancer follow-up     HPI: 59 year-old female underwent laparoscopic/robotic right hemicolectomy in September 2021 with final pathology revealing moderately differentiated adenocarcinoma of the cecum, 6 cm in maximum diameter invading through the muscularis propria into pericolorectal tissue, 2 out of 36 resected lymph nodes were positive for metastatic adenocarcinoma.  She completed adjuvant chemotherapy with Dr. Pompa.  She reports satisfactory bowel function, no frequency or constipation.  Surveillance CT scans have showed no evidence of recurrent or metastatic disease    She presents for breast cancer surveillance today after recent mammography    She denies breast masses, skin changes, nipple inversion or discharge on self-breast exam.    Recent surveillance mammography was reviewed, I personally interpreted the images and reviewed the report.  I discussed with the patient in detail and questions were addressed.  There are no suspicious findings.    Greater than 30 minutes was required for complete chart review, imaging review patient consultation and documentation      Visit today included increased complexity associated with the care of the episodic problem colon cancer, breast cancer screening addressed and managing the longitudinal care of the patient due to the serious and/or complex managed problem(s) colon cancer, breast cancer screening.      Past Medical and Surgical History  Allergies :   Penicillins    [unfilled]  Medical :   She has a past medical history of Colon cancer, GERD (gastroesophageal reflux disease), Hypertension, MAYNOR (iron deficiency anemia), and Port-A-Cath in place (10/29/2021).    Surgical :   She has a past surgical history that includes Mediport insertion, single; Laparoscopic partial colectomy (Right); Lip Surgery; Mediport removal; Mole removal; Colonoscopy (06/06/2022); Colon surgery (09/09/2021); and Colonoscopy (07/28/2021).     Family History  Her  family history includes Cancer in her maternal grandmother; Diabetes in her brother; Heart disease in her mother.    Social History  She reports that she has never smoked. She has never used smokeless tobacco. She reports that she does not currently use alcohol. She reports that she does not use drugs.     Review of Systems   Constitutional:  Negative for appetite change, chills, diaphoresis and fever.   HENT:  Negative for congestion, drooling, ear discharge, ear pain and hearing loss.    Eyes:  Negative for discharge.   Respiratory:  Negative for apnea, cough, choking, chest tightness, shortness of breath and stridor.    Cardiovascular:  Negative for chest pain, palpitations and leg swelling.   Endocrine: Negative for cold intolerance and heat intolerance.   Genitourinary:  Negative for difficulty urinating, dyspareunia, dysuria and hematuria.   Musculoskeletal:  Negative for arthralgias, gait problem and joint swelling.   Skin:  Negative for color change and rash.   Neurological:  Negative for dizziness, tremors, seizures, syncope, facial asymmetry, speech difficulty, light-headedness, numbness and headaches.   Psychiatric/Behavioral:  Negative for agitation and confusion.         Objective   Physical Exam  Vitals and nursing note reviewed.   Constitutional:       General: She is not in acute distress.     Appearance: Normal appearance. She is not ill-appearing, toxic-appearing or diaphoretic.   HENT:      Head: Normocephalic and atraumatic.      Right Ear: External ear normal.      Left Ear: External ear normal.      Nose: Nose normal.      Mouth/Throat:      Mouth: Mucous membranes are moist.      Pharynx: Oropharynx is clear.   Eyes:      General: No scleral icterus.     Extraocular Movements: Extraocular movements intact.      Conjunctiva/sclera: Conjunctivae normal.      Pupils: Pupils are equal, round, and reactive to light.   Cardiovascular:      Rate and Rhythm: Normal rate and regular rhythm.       "Pulses: Normal pulses.      Heart sounds: No murmur heard.     No friction rub. No gallop.   Pulmonary:      Effort: Pulmonary effort is normal. No respiratory distress.      Breath sounds: Normal breath sounds. No stridor.   Abdominal:      General: Abdomen is flat. There is no distension.      Palpations: Abdomen is soft. There is no mass.      Tenderness: There is no abdominal tenderness. There is no right CVA tenderness, left CVA tenderness, guarding or rebound.      Hernia: No hernia is present.   Musculoskeletal:         General: No swelling, tenderness, deformity or signs of injury.      Cervical back: Normal range of motion and neck supple. No rigidity or tenderness.      Right lower leg: No edema.      Left lower leg: No edema.   Lymphadenopathy:      Cervical: No cervical adenopathy.   Skin:     General: Skin is warm.      Capillary Refill: Capillary refill takes less than 2 seconds.      Coloration: Skin is not jaundiced or pale.      Findings: No bruising, erythema, lesion or rash.   Neurological:      General: No focal deficit present.      Mental Status: She is alert and oriented to person, place, and time. Mental status is at baseline.      Cranial Nerves: No cranial nerve deficit.      Sensory: No sensory deficit.      Motor: No weakness.      Coordination: Coordination normal.      Gait: Gait normal.   Psychiatric:         Mood and Affect: Mood normal.         Behavior: Behavior normal.         Thought Content: Thought content normal.         Judgment: Judgment normal.       VITAL SIGNS: 24 HR MIN & MAX LAST    @FLOWSTAT(6:24::1)@           @FLOWSTAT(5:24::1)@        @FLOWSTAT(8:24::1)@  88     @FLOWSTAT(9:24::1)@       @FLOWSTAT(10:24::1)@         HT: 5' 4" (162.6 cm)  WT: 82.1 kg (181 lb)  BMI: 31.1       Assessment & Plan     Stage III adenocarcinoma cecum status post laparoscopic/robotic right hemicolectomy September 2021, adjuvant chemotherapy    No evidence of disease  Continue colon cancer " surveillance imaging per medical oncology protocol   Return to clinic in 1 year with routine screening mammography    Agustin Serra MD  Surgical Oncology  Complex General, Gastrointestinal and Hepatobiliary Surgery

## 2024-05-28 DIAGNOSIS — T45.1X5A NEUROPATHY DUE TO CHEMOTHERAPEUTIC DRUG: ICD-10-CM

## 2024-05-28 DIAGNOSIS — G62.0 NEUROPATHY DUE TO CHEMOTHERAPEUTIC DRUG: ICD-10-CM

## 2024-05-28 RX ORDER — PREGABALIN 100 MG/1
100 CAPSULE ORAL 2 TIMES DAILY
Qty: 60 CAPSULE | Refills: 1 | Status: SHIPPED | OUTPATIENT
Start: 2024-05-28

## 2024-08-27 ENCOUNTER — LAB VISIT (OUTPATIENT)
Dept: LAB | Facility: HOSPITAL | Age: 59
End: 2024-08-27
Attending: INTERNAL MEDICINE
Payer: COMMERCIAL

## 2024-08-27 ENCOUNTER — OFFICE VISIT (OUTPATIENT)
Dept: INTERNAL MEDICINE | Facility: CLINIC | Age: 59
End: 2024-08-27
Payer: COMMERCIAL

## 2024-08-27 VITALS
TEMPERATURE: 98 F | RESPIRATION RATE: 14 BRPM | HEIGHT: 64 IN | HEART RATE: 61 BPM | BODY MASS INDEX: 30.56 KG/M2 | WEIGHT: 179 LBS | DIASTOLIC BLOOD PRESSURE: 78 MMHG | SYSTOLIC BLOOD PRESSURE: 179 MMHG | OXYGEN SATURATION: 99 %

## 2024-08-27 DIAGNOSIS — E78.5 HYPERLIPIDEMIA, UNSPECIFIED HYPERLIPIDEMIA TYPE: ICD-10-CM

## 2024-08-27 DIAGNOSIS — Z00.00 HEALTHCARE MAINTENANCE: Primary | ICD-10-CM

## 2024-08-27 DIAGNOSIS — I10 HYPERTENSION, ESSENTIAL, BENIGN: ICD-10-CM

## 2024-08-27 DIAGNOSIS — Z00.00 HEALTHCARE MAINTENANCE: ICD-10-CM

## 2024-08-27 DIAGNOSIS — G62.9 NEUROPATHY: ICD-10-CM

## 2024-08-27 DIAGNOSIS — G56.30 RADIAL NEUROPATHY, UNSPECIFIED LATERALITY: ICD-10-CM

## 2024-08-27 LAB
25(OH)D3+25(OH)D2 SERPL-MCNC: 27 NG/ML (ref 30–80)
ALBUMIN SERPL-MCNC: 4 G/DL (ref 3.5–5)
ALBUMIN/GLOB SERPL: 0.9 RATIO (ref 1.1–2)
ALP SERPL-CCNC: 168 UNIT/L (ref 40–150)
ALT SERPL-CCNC: 17 UNIT/L (ref 0–55)
ANION GAP SERPL CALC-SCNC: 10 MEQ/L
AST SERPL-CCNC: 26 UNIT/L (ref 5–34)
BASOPHILS # BLD AUTO: 0.02 X10(3)/MCL
BASOPHILS NFR BLD AUTO: 0.3 %
BILIRUB SERPL-MCNC: 0.6 MG/DL
BUN SERPL-MCNC: 8.1 MG/DL (ref 9.8–20.1)
CALCIUM SERPL-MCNC: 9.9 MG/DL (ref 8.4–10.2)
CHLORIDE SERPL-SCNC: 106 MMOL/L (ref 98–107)
CHOLEST SERPL-MCNC: 240 MG/DL
CHOLEST/HDLC SERPL: 3 {RATIO} (ref 0–5)
CO2 SERPL-SCNC: 23 MMOL/L (ref 22–29)
CREAT SERPL-MCNC: 1.04 MG/DL (ref 0.55–1.02)
CREAT/UREA NIT SERPL: 8
EOSINOPHIL # BLD AUTO: 0.1 X10(3)/MCL (ref 0–0.9)
EOSINOPHIL NFR BLD AUTO: 1.4 %
ERYTHROCYTE [DISTWIDTH] IN BLOOD BY AUTOMATED COUNT: 17 % (ref 11.5–17)
EST. AVERAGE GLUCOSE BLD GHB EST-MCNC: 119.8 MG/DL
FOLATE SERPL-MCNC: 10.5 NG/ML (ref 7–31.4)
GFR SERPLBLD CREATININE-BSD FMLA CKD-EPI: >60 ML/MIN/1.73/M2
GLOBULIN SER-MCNC: 4.3 GM/DL (ref 2.4–3.5)
GLUCOSE SERPL-MCNC: 91 MG/DL (ref 74–100)
HBA1C MFR BLD: 5.8 %
HCT VFR BLD AUTO: 37.2 % (ref 37–47)
HDLC SERPL-MCNC: 71 MG/DL (ref 35–60)
HGB BLD-MCNC: 12 G/DL (ref 12–16)
IMM GRANULOCYTES # BLD AUTO: 0.01 X10(3)/MCL (ref 0–0.04)
IMM GRANULOCYTES NFR BLD AUTO: 0.1 %
LDLC SERPL CALC-MCNC: 151 MG/DL (ref 50–140)
LYMPHOCYTES # BLD AUTO: 2.14 X10(3)/MCL (ref 0.6–4.6)
LYMPHOCYTES NFR BLD AUTO: 29 %
MCH RBC QN AUTO: 26 PG (ref 27–31)
MCHC RBC AUTO-ENTMCNC: 32.3 G/DL (ref 33–36)
MCV RBC AUTO: 80.5 FL (ref 80–94)
MONOCYTES # BLD AUTO: 0.47 X10(3)/MCL (ref 0.1–1.3)
MONOCYTES NFR BLD AUTO: 6.4 %
NEUTROPHILS # BLD AUTO: 4.63 X10(3)/MCL (ref 2.1–9.2)
NEUTROPHILS NFR BLD AUTO: 62.8 %
NRBC BLD AUTO-RTO: 0 %
PLATELET # BLD AUTO: 253 X10(3)/MCL (ref 130–400)
PMV BLD AUTO: 10.7 FL (ref 7.4–10.4)
POTASSIUM SERPL-SCNC: 4 MMOL/L (ref 3.5–5.1)
PROT SERPL-MCNC: 8.3 GM/DL (ref 6.4–8.3)
RBC # BLD AUTO: 4.62 X10(6)/MCL (ref 4.2–5.4)
SODIUM SERPL-SCNC: 139 MMOL/L (ref 136–145)
TRIGL SERPL-MCNC: 89 MG/DL (ref 37–140)
TSH SERPL-ACNC: 1.65 UIU/ML (ref 0.35–4.94)
VIT B12 SERPL-MCNC: 445 PG/ML (ref 213–816)
VLDLC SERPL CALC-MCNC: 18 MG/DL
WBC # BLD AUTO: 7.37 X10(3)/MCL (ref 4.5–11.5)

## 2024-08-27 PROCEDURE — 83036 HEMOGLOBIN GLYCOSYLATED A1C: CPT

## 2024-08-27 PROCEDURE — 82746 ASSAY OF FOLIC ACID SERUM: CPT

## 2024-08-27 PROCEDURE — 36415 COLL VENOUS BLD VENIPUNCTURE: CPT

## 2024-08-27 PROCEDURE — 85025 COMPLETE CBC W/AUTO DIFF WBC: CPT

## 2024-08-27 PROCEDURE — 80053 COMPREHEN METABOLIC PANEL: CPT

## 2024-08-27 PROCEDURE — 84443 ASSAY THYROID STIM HORMONE: CPT

## 2024-08-27 PROCEDURE — 80061 LIPID PANEL: CPT

## 2024-08-27 PROCEDURE — 82306 VITAMIN D 25 HYDROXY: CPT

## 2024-08-27 PROCEDURE — 99214 OFFICE O/P EST MOD 30 MIN: CPT | Mod: PBBFAC | Performed by: INTERNAL MEDICINE

## 2024-08-27 PROCEDURE — 82607 VITAMIN B-12: CPT

## 2024-08-27 NOTE — PROGRESS NOTES
[facility] INTERNAL MEDICINE  OUTPATIENT OFFICE VISIT NOTE    SUBJECTIVE:    HPI: Ami Smith is a 59 y.o. female w/ PMH of stage IIIb adenocarcinoma of cecum (diagnosed 7/20/21) s/p right hemicolectomy and adjuvant FOLFOX, who presents today tfor a follow up appointment to discuss lab work.   She currently follows Oncology and is compliant with appointments.   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    Patient takes Norvasc 10 mg for hypertension. She denies any side effects from the medications.   Patient denies chest pain, palpitations, and shortness of breath.   Patient denies fever, night sweats, chills, nausea, vomiting, diarrhea, constipation, weight loss, and changes in appetite.    Allergies: None  PMH: as stated above  SxH: right hemicolectomy  SH: denies illicit drug use, alcohol use, tobacco use  Gyn: 2 kids, vaginal birth, last menstrual cycle 4 years ago.       Review of Systems:  Constitutional: No fever, No chills, No sweats, No weakness, No fatigue, No decreased activity.   Eye: No recent visual problem, No icterus, No double vision.  Ear/Nose/Mouth/Throat: No nasal congestion, No sore throat.   Respiratory: No shortness of breath, No cough, No sputum production, No hemoptysis,   No wheezing, No cyanosis.   Cardiovascular: No chest pain, No palpitations, No peripheral edema, No syncope.   Gastrointestinal: No nausea, No vomiting, No diarrhea, No constipation, No hematemesis.   Genitourinary: No dysuria, No hematuria, No change in urine stream, No urethral discharge.   Hematology/Lymphatics: No bruising tendency, No bleeding tendency.   Endocrine: No polyuria, No cold intolerance, No heat intolerance.   Immunologic: Not immunocompromised, No recurrent fevers.   Musculoskeletal: No joint pain, No claudication.   Integumentary: No rash, No pruritus, No abrasions, No petechiae.    Neurologic: Alert and oriented  "X4, No abnormal balance, No numbness, No tingling.   Psychiatric: No anxiety, no depression, Not suicidal, Not delusional, No hallucinations.     OBJECTIVE:    Vitals:   BP (!) 179/78 (BP Location: Left arm, Patient Position: Sitting, BP Method: Medium (Automatic))   Pulse 61   Temp 98 °F (36.7 °C) (Oral)   Resp 14   Ht 5' 4" (1.626 m)   Wt 81.2 kg (179 lb)   SpO2 99%   BMI 30.73 kg/m²      Physical Exam:  General: Alert and oriented, No acute distress.   Eye: Pupils are equal, round and reactive to light, Extraocular movements are intact,   Normal conjunctiva, Vision unchanged.   HENT: Normocephalic, Normal hearing, Oral mucosa is moist.   Neck: Supple, No carotid bruit, No jugular venous distention, No thyromegaly.   Respiratory: Lungs are clear to auscultation, Respirations are non-labored, Breath sounds   are equal, Symmetrical chest wall expansion, No chest wall tenderness.   Cardiovascular: Normal rate, Regular rhythm, No murmur, No gallop, Good pulses equal in   All extremities, Normal peripheral perfusion, No edema.   Gastrointestinal: Soft, Non-tender, Non-distended, Normal bowel sounds.   Genitourinary: No costovertebral angle tenderness, No suprapubic tenderness.   Musculoskeletal: Normal range of motion, Normal strength, No swelling, No deformity, Normal gait.   Integumentary: Warm, No pallor, No rash.   Neurologic: Alert, Oriented, Normal sensory, Normal motor function, No focal deficits,   Cranial Nerves II-XII are grossly intact, Monofilament examination: No sensory loss on plantar   or dorsal surface of feet bilaterally.   Psychiatric: Cooperative, Appropriate mood & affect.     Significant findings:      ASSESSMENT & PLAN:    Stage IIIb adenocarcinoma of cecum   Diagnosed 7/20/21  S/p right hemicolectomy and adjuvant FOLFOX  Currently follows Oncology, continue  CT abdomen and pelvis with contrast 02/2023 revealed no acute process seen or evidence of metastatic disease to chest, abdomen, and " pelvis.   Repeat colonoscopy 06/2022  Due for another colonoscopy 06/2025    2.  Hypertension  Blood pressure 179/78  Currently on Norvasc 10 mg daily, increased at last appointment.  Begin Hydrochlorothiazide 25 mg daily.   Keep blood pressure less than 120/80.   Education: Low salt diet and increase water intake. Exercise and weight loss.    Will adjust medications again at next visit if needed.     3. Neuropathy  Was told it was due to side effects of chemotherapy  Currently on Gabapentin to 800 mg TID, discontinue  Has broken medication in half due to being dizzy, 400 mg is tolerable.   Prescribed 600 mg TID, currently on this dose, continue.     4. Hyperlipidemia  , elevated  ACC  ASCVR Risk score 6.3%  Patient does want to try medication now  Begin Fish oil  Recheck today.     5. History of Vitamin D deficiency   Current Vitamin D 52.2  Continue daily Vitamin D     6. Elevated alkaline phosphatase- resolved   Alkaline phosphatase is 15, repeat is 143  Will monitor for elevation     7. Obesity with cardiovascular risk factor  BMI 30.73, previously BMI 27.10  Cardiovascular risk factor- Hypertension  Continue medications  Was suppose to start Ozempic, discussed family history of thyroid cancer and side effects. Patient states she understands and agrees. But, the insurance will not cover.     8. Neuropathy        Health Maintenance:  Breast cancer screening- mammogram 05/05/2023 revealed BI-RADS 1, next screening 05/2024  Cervical cancer screening- referral completed, she has to call back since she missed their call.     Return to clinic in 4 weeks for blood pressure evaluation.     Need to discuss triglyceride level and options at next appointment.       Lolita Salazar MD  Internal Medicine  [facility]

## 2024-08-28 ENCOUNTER — PATIENT MESSAGE (OUTPATIENT)
Dept: INTERNAL MEDICINE | Facility: CLINIC | Age: 59
End: 2024-08-28
Payer: COMMERCIAL

## 2024-08-28 DIAGNOSIS — E78.5 HYPERLIPIDEMIA, UNSPECIFIED HYPERLIPIDEMIA TYPE: ICD-10-CM

## 2024-08-28 DIAGNOSIS — I10 HYPERTENSION, ESSENTIAL, BENIGN: ICD-10-CM

## 2024-08-28 DIAGNOSIS — E66.9 OBESITY, UNSPECIFIED CLASSIFICATION, UNSPECIFIED OBESITY TYPE, UNSPECIFIED WHETHER SERIOUS COMORBIDITY PRESENT: Primary | ICD-10-CM

## 2024-08-28 DIAGNOSIS — E66.3 OVERWEIGHT (BMI 25.0-29.9): ICD-10-CM

## 2024-08-29 RX ORDER — HYDROCHLOROTHIAZIDE 25 MG/1
25 TABLET ORAL DAILY
Qty: 30 TABLET | Refills: 1 | Status: SHIPPED | OUTPATIENT
Start: 2024-08-29 | End: 2025-08-29

## 2024-08-29 RX ORDER — PANTOPRAZOLE SODIUM 40 MG/1
40 TABLET, DELAYED RELEASE ORAL DAILY
Qty: 90 TABLET | Refills: 1 | Status: SHIPPED | OUTPATIENT
Start: 2024-08-29

## 2024-08-29 RX ORDER — NALTREXONE HYDROCHLORIDE AND BUPROPION HYDROCHLORIDE 8; 90 MG/1; MG/1
8-90 TABLET, EXTENDED RELEASE ORAL 2 TIMES DAILY
Qty: 60 TABLET | Refills: 1 | Status: SHIPPED | OUTPATIENT
Start: 2024-08-29

## 2024-08-29 RX ORDER — AMLODIPINE BESYLATE 10 MG/1
10 TABLET ORAL DAILY
Qty: 30 TABLET | Refills: 2 | Status: SHIPPED | OUTPATIENT
Start: 2024-08-29

## 2024-08-29 NOTE — TELEPHONE ENCOUNTER
Called patient I informed the patient I sent Contrave prescription. This medication is being used because this is the only medication her insurance would pay for. Left message and instructed patient to wait to begin medication. Her blood pressure was elevated at last visit. Sent hydrochlorothiazide in and keep blood pressure log for one week then we can decide if she can start the medication.

## 2024-09-04 ENCOUNTER — TELEPHONE (OUTPATIENT)
Dept: INTERNAL MEDICINE | Facility: CLINIC | Age: 59
End: 2024-09-04
Payer: COMMERCIAL

## 2024-09-04 NOTE — LETTER
September 10, 2024    Ami Smith  [address]  [facility] [place] [facility] [address]        [facility]  [address] [address]  Phone: [phone] Dear Mrs. Smith:    We are writing to you because we have made multiple attempts to reach you by phone and have been unsuccessful.      Please contact [facility] at your earliest convenience to address your concerns.      Sincerely,  [facility]

## 2024-09-04 NOTE — TELEPHONE ENCOUNTER
Pt called, no answer, message left informing pt to call office and report what medications she needs refilled. Pt's handicap tag awaiting Dr. Chance's approval, paperwork placed in Dr. Chance's folder for signature. Call ended.

## 2024-09-04 NOTE — TELEPHONE ENCOUNTER
----- Message from Joyce Fox sent at 9/4/2024  2:52 PM CDT -----  Regarding: Dr Salazar  Caller is:  (Parker) Patient       Provider:Dr Salazar    Last Visit:8/27/24    Next Visit:9/24/24    Reason for Call: Pt ask that Dr or Nurse give her a call-refill and handicap tag            Preferred Phone Number: [phone]

## 2024-09-04 NOTE — TELEPHONE ENCOUNTER
MS. KNIGHT CALLED ASKING THAT SOMEONE GIVE HER A CALL REGARDING HER ULTRASOUND.  NO ANSWER WHEN WE TRIED TO RETURN HER CALL.

## 2024-09-06 NOTE — TELEPHONE ENCOUNTER
2nd attempt-Placed call to patient x2 attempts. No answer. Patient advised via voicemail to contact clinic at earliest convenience.

## 2024-09-10 NOTE — TELEPHONE ENCOUNTER
3rd attempt-   Placed call to patient x2 attempts. No answer.Unable to leave voicemail due to mailbox being full. Patient letter will be mailed to patient at address listed in chart

## 2024-09-12 DIAGNOSIS — I10 HYPERTENSION, ESSENTIAL, BENIGN: Primary | ICD-10-CM

## 2024-09-13 RX ORDER — HYDROCHLOROTHIAZIDE 25 MG/1
25 TABLET ORAL DAILY
Qty: 30 TABLET | Refills: 1 | Status: SHIPPED | OUTPATIENT
Start: 2024-09-13 | End: 2025-09-13

## 2024-11-06 DIAGNOSIS — T45.1X5A NEUROPATHY DUE TO CHEMOTHERAPEUTIC DRUG: ICD-10-CM

## 2024-11-06 DIAGNOSIS — G62.0 NEUROPATHY DUE TO CHEMOTHERAPEUTIC DRUG: ICD-10-CM

## 2024-11-06 RX ORDER — PREGABALIN 100 MG/1
100 CAPSULE ORAL 2 TIMES DAILY
Qty: 60 CAPSULE | Refills: 1 | Status: SHIPPED | OUTPATIENT
Start: 2024-11-06

## 2025-02-05 DIAGNOSIS — T45.1X5A NEUROPATHY DUE TO CHEMOTHERAPEUTIC DRUG: ICD-10-CM

## 2025-02-05 DIAGNOSIS — G62.0 NEUROPATHY DUE TO CHEMOTHERAPEUTIC DRUG: ICD-10-CM

## 2025-02-05 RX ORDER — PREGABALIN 100 MG/1
100 CAPSULE ORAL 2 TIMES DAILY
Qty: 60 CAPSULE | Refills: 1 | Status: SHIPPED | OUTPATIENT
Start: 2025-02-05

## 2025-03-25 ENCOUNTER — LAB VISIT (OUTPATIENT)
Dept: LAB | Facility: HOSPITAL | Age: 60
End: 2025-03-25
Attending: INTERNAL MEDICINE
Payer: COMMERCIAL

## 2025-03-25 ENCOUNTER — OFFICE VISIT (OUTPATIENT)
Dept: INTERNAL MEDICINE | Facility: CLINIC | Age: 60
End: 2025-03-25
Payer: COMMERCIAL

## 2025-03-25 VITALS
TEMPERATURE: 98 F | SYSTOLIC BLOOD PRESSURE: 154 MMHG | HEIGHT: 66 IN | DIASTOLIC BLOOD PRESSURE: 76 MMHG | HEART RATE: 68 BPM | BODY MASS INDEX: 29.44 KG/M2 | WEIGHT: 183.19 LBS | OXYGEN SATURATION: 98 %

## 2025-03-25 DIAGNOSIS — Z00.00 HEALTHCARE MAINTENANCE: ICD-10-CM

## 2025-03-25 DIAGNOSIS — G62.0 NEUROPATHY DUE TO CHEMOTHERAPEUTIC DRUG: ICD-10-CM

## 2025-03-25 DIAGNOSIS — Z12.31 BREAST CANCER SCREENING BY MAMMOGRAM: ICD-10-CM

## 2025-03-25 DIAGNOSIS — T45.1X5A NEUROPATHY DUE TO CHEMOTHERAPEUTIC DRUG: ICD-10-CM

## 2025-03-25 DIAGNOSIS — G56.30 RADIAL NEUROPATHY, UNSPECIFIED LATERALITY: ICD-10-CM

## 2025-03-25 DIAGNOSIS — Z12.39 ENCOUNTER FOR SCREENING FOR MALIGNANT NEOPLASM OF BREAST, UNSPECIFIED SCREENING MODALITY: Primary | ICD-10-CM

## 2025-03-25 DIAGNOSIS — E66.3 OVERWEIGHT (BMI 25.0-29.9): ICD-10-CM

## 2025-03-25 DIAGNOSIS — I10 HYPERTENSION, ESSENTIAL, BENIGN: ICD-10-CM

## 2025-03-25 LAB
25(OH)D3+25(OH)D2 SERPL-MCNC: 29 NG/ML (ref 30–80)
ALBUMIN SERPL-MCNC: 4 G/DL (ref 3.5–5)
ALBUMIN/GLOB SERPL: 1 RATIO (ref 1.1–2)
ALP SERPL-CCNC: 153 UNIT/L (ref 40–150)
ALT SERPL-CCNC: 14 UNIT/L (ref 0–55)
ANION GAP SERPL CALC-SCNC: 7 MEQ/L
AST SERPL-CCNC: 20 UNIT/L (ref 11–45)
BACTERIA #/AREA URNS AUTO: NORMAL /HPF
BASOPHILS # BLD AUTO: 0.04 X10(3)/MCL
BASOPHILS NFR BLD AUTO: 0.5 %
BILIRUB SERPL-MCNC: 0.4 MG/DL
BILIRUB UR QL STRIP.AUTO: NEGATIVE
BUN SERPL-MCNC: 9.4 MG/DL (ref 9.8–20.1)
CALCIUM SERPL-MCNC: 9.5 MG/DL (ref 8.4–10.2)
CHLORIDE SERPL-SCNC: 108 MMOL/L (ref 98–107)
CHOLEST SERPL-MCNC: 207 MG/DL
CHOLEST/HDLC SERPL: 3 {RATIO} (ref 0–5)
CLARITY UR: CLEAR
CO2 SERPL-SCNC: 24 MMOL/L (ref 22–29)
COLOR UR AUTO: NORMAL
CREAT SERPL-MCNC: 0.87 MG/DL (ref 0.55–1.02)
CREAT/UREA NIT SERPL: 11
EOSINOPHIL # BLD AUTO: 0.17 X10(3)/MCL (ref 0–0.9)
EOSINOPHIL NFR BLD AUTO: 2.2 %
ERYTHROCYTE [DISTWIDTH] IN BLOOD BY AUTOMATED COUNT: 17.3 % (ref 11.5–17)
EST. AVERAGE GLUCOSE BLD GHB EST-MCNC: 119.8 MG/DL
GFR SERPLBLD CREATININE-BSD FMLA CKD-EPI: >60 ML/MIN/1.73/M2
GLOBULIN SER-MCNC: 4 GM/DL (ref 2.4–3.5)
GLUCOSE SERPL-MCNC: 82 MG/DL (ref 74–100)
GLUCOSE UR QL STRIP: NORMAL
HBA1C MFR BLD: 5.8 %
HCT VFR BLD AUTO: 35.6 % (ref 37–47)
HDLC SERPL-MCNC: 69 MG/DL (ref 35–60)
HGB BLD-MCNC: 11.4 G/DL (ref 12–16)
HGB UR QL STRIP: NEGATIVE
HYALINE CASTS #/AREA URNS LPF: NORMAL /LPF
IMM GRANULOCYTES # BLD AUTO: 0.01 X10(3)/MCL (ref 0–0.04)
IMM GRANULOCYTES NFR BLD AUTO: 0.1 %
KETONES UR QL STRIP: NEGATIVE
LDLC SERPL CALC-MCNC: 118 MG/DL (ref 50–140)
LEUKOCYTE ESTERASE UR QL STRIP: NEGATIVE
LYMPHOCYTES # BLD AUTO: 2.67 X10(3)/MCL (ref 0.6–4.6)
LYMPHOCYTES NFR BLD AUTO: 34.7 %
MCH RBC QN AUTO: 25.9 PG (ref 27–31)
MCHC RBC AUTO-ENTMCNC: 32 G/DL (ref 33–36)
MCV RBC AUTO: 80.7 FL (ref 80–94)
MONOCYTES # BLD AUTO: 0.51 X10(3)/MCL (ref 0.1–1.3)
MONOCYTES NFR BLD AUTO: 6.6 %
NEUTROPHILS # BLD AUTO: 4.29 X10(3)/MCL (ref 2.1–9.2)
NEUTROPHILS NFR BLD AUTO: 55.9 %
NITRITE UR QL STRIP: NEGATIVE
NRBC BLD AUTO-RTO: 0 %
PH UR STRIP: 6.5 [PH]
PLATELET # BLD AUTO: 258 X10(3)/MCL (ref 130–400)
PMV BLD AUTO: 10.8 FL (ref 7.4–10.4)
POTASSIUM SERPL-SCNC: 4.1 MMOL/L (ref 3.5–5.1)
PROT SERPL-MCNC: 8 GM/DL (ref 6.4–8.3)
PROT UR QL STRIP: NEGATIVE
RBC # BLD AUTO: 4.41 X10(6)/MCL (ref 4.2–5.4)
RBC #/AREA URNS AUTO: NORMAL /HPF
SODIUM SERPL-SCNC: 139 MMOL/L (ref 136–145)
SP GR UR STRIP.AUTO: 1 (ref 1–1.03)
SQUAMOUS #/AREA URNS LPF: NORMAL /HPF
TRIGL SERPL-MCNC: 99 MG/DL (ref 37–140)
TSH SERPL-ACNC: 1.53 UIU/ML (ref 0.35–4.94)
UROBILINOGEN UR STRIP-ACNC: NORMAL
VLDLC SERPL CALC-MCNC: 20 MG/DL
WBC # BLD AUTO: 7.69 X10(3)/MCL (ref 4.5–11.5)
WBC #/AREA URNS AUTO: NORMAL /HPF

## 2025-03-25 PROCEDURE — 80053 COMPREHEN METABOLIC PANEL: CPT

## 2025-03-25 PROCEDURE — 85025 COMPLETE CBC W/AUTO DIFF WBC: CPT

## 2025-03-25 PROCEDURE — 84443 ASSAY THYROID STIM HORMONE: CPT

## 2025-03-25 PROCEDURE — 99215 OFFICE O/P EST HI 40 MIN: CPT | Mod: PBBFAC | Performed by: INTERNAL MEDICINE

## 2025-03-25 PROCEDURE — 81001 URINALYSIS AUTO W/SCOPE: CPT

## 2025-03-25 PROCEDURE — 83036 HEMOGLOBIN GLYCOSYLATED A1C: CPT

## 2025-03-25 PROCEDURE — 82306 VITAMIN D 25 HYDROXY: CPT

## 2025-03-25 PROCEDURE — 80061 LIPID PANEL: CPT

## 2025-03-25 PROCEDURE — 36415 COLL VENOUS BLD VENIPUNCTURE: CPT

## 2025-03-25 RX ORDER — PREGABALIN 100 MG/1
100 CAPSULE ORAL 2 TIMES DAILY
Qty: 60 CAPSULE | Refills: 6 | Status: SHIPPED | OUTPATIENT
Start: 2025-03-25

## 2025-03-25 RX ORDER — PANTOPRAZOLE SODIUM 40 MG/1
40 TABLET, DELAYED RELEASE ORAL DAILY
Qty: 30 TABLET | Refills: 6 | Status: SHIPPED | OUTPATIENT
Start: 2025-03-25

## 2025-03-25 RX ORDER — AMLODIPINE BESYLATE 10 MG/1
10 TABLET ORAL DAILY
Qty: 30 TABLET | Refills: 6 | Status: SHIPPED | OUTPATIENT
Start: 2025-03-25

## 2025-03-25 RX ORDER — PREGABALIN 100 MG/1
100 CAPSULE ORAL 2 TIMES DAILY
Qty: 60 CAPSULE | Refills: 6 | Status: SHIPPED | OUTPATIENT
Start: 2025-03-25 | End: 2025-03-25 | Stop reason: SDUPTHER

## 2025-03-25 RX ORDER — HYDROCHLOROTHIAZIDE 25 MG/1
25 TABLET ORAL DAILY
Qty: 30 TABLET | Refills: 6 | Status: SHIPPED | OUTPATIENT
Start: 2025-03-25 | End: 2026-03-25

## 2025-03-25 NOTE — PROGRESS NOTES
[facility] INTERNAL MEDICINE  OUTPATIENT OFFICE VISIT NOTE    SUBJECTIVE:    HPI: Ami Smith is a 59 y.o. female w/ PMH of stage IIIb adenocarcinoma of cecum (diagnosed 7/20/21) s/p right hemicolectomy and adjuvant FOLFOX, who presents today tfor a follow up appointment to discuss lab work.   She currently follows Oncology and is compliant with appointments.   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    Patient takes Norvasc 10 mg for hypertension. She denies any side effects from the medications.   Patient denies chest pain, palpitations, and shortness of breath.   Patient denies fever, night sweats, chills, nausea, vomiting, diarrhea, constipation, weight loss, and changes in appetite.    Allergies: None  PMH: as stated above  SxH: right hemicolectomy  SH: denies illicit drug use, alcohol use, tobacco use  Gyn: 2 kids, vaginal birth, last menstrual cycle 4 years ago.       Review of Systems:  Constitutional: No fever, No chills, No sweats, No weakness, No fatigue, No decreased activity.   Eye: No recent visual problem, No icterus, No double vision.  Ear/Nose/Mouth/Throat: No nasal congestion, No sore throat.   Respiratory: No shortness of breath, No cough, No sputum production, No hemoptysis,   No wheezing, No cyanosis.   Cardiovascular: No chest pain, No palpitations, No peripheral edema, No syncope.   Gastrointestinal: No nausea, No vomiting, No diarrhea, No constipation, No hematemesis.   Genitourinary: No dysuria, No hematuria, No change in urine stream, No urethral discharge.   Hematology/Lymphatics: No bruising tendency, No bleeding tendency.   Endocrine: No polyuria, No cold intolerance, No heat intolerance.   Immunologic: Not immunocompromised, No recurrent fevers.   Musculoskeletal: No joint pain, No claudication.   Integumentary: No rash, No pruritus, No abrasions, No petechiae.    Neurologic: Alert and oriented  "X4, No abnormal balance, No numbness, No tingling.   Psychiatric: No anxiety, no depression, Not suicidal, Not delusional, No hallucinations.     OBJECTIVE:    Vitals:   BP (!) 154/76 (Patient Position: Sitting)   Pulse 68   Temp 98.1 °F (36.7 °C) (Oral)   Ht 5' 6" (1.676 m)   Wt 83.1 kg (183 lb 3.2 oz)   SpO2 98%   BMI 29.57 kg/m²      Physical Exam:  General: Alert and oriented, No acute distress.   Eye: Pupils are equal, round and reactive to light, Extraocular movements are intact,   Normal conjunctiva, Vision unchanged.   HENT: Normocephalic, Normal hearing, Oral mucosa is moist.   Neck: Supple, No carotid bruit, No jugular venous distention, No thyromegaly.   Respiratory: Lungs are clear to auscultation, Respirations are non-labored, Breath sounds   are equal, Symmetrical chest wall expansion, No chest wall tenderness.   Cardiovascular: Normal rate, Regular rhythm, No murmur, No gallop, Good pulses equal in   All extremities, Normal peripheral perfusion, No edema.   Gastrointestinal: Soft, Non-tender, Non-distended, Normal bowel sounds.   Genitourinary: No costovertebral angle tenderness, No suprapubic tenderness.   Musculoskeletal: Normal range of motion, Normal strength, No swelling, No deformity, Normal gait.   Integumentary: Warm, No pallor, No rash.   Neurologic: Alert, Oriented, Normal sensory, Normal motor function, No focal deficits,   Cranial Nerves II-XII are grossly intact, Monofilament examination: No sensory loss on plantar   or dorsal surface of feet bilaterally.   Psychiatric: Cooperative, Appropriate mood & affect.     Significant findings:      ASSESSMENT & PLAN:    Stage IIIb adenocarcinoma of cecum   Diagnosed 7/20/21  S/p right hemicolectomy and adjuvant FOLFOX  Currently follows Oncology, continue  CT abdomen and pelvis with contrast 02/2023 revealed no acute process seen or evidence of metastatic disease to chest, abdomen, and pelvis.   Repeat colonoscopy 06/2022  Due for another " colonoscopy 06/2025    2.  Hypertension  Blood pressure 154/76  Currently on Norvasc 10 mg daily, increased at last appointment.  Begin Hydrochlorothiazide 25 mg daily.   Keep blood pressure less than 120/80.   Education: Low salt diet and increase water intake. Exercise and weight loss.    Will adjust medications again at next visit if needed.     3. Neuropathy  Was told it was due to side effects of chemotherapy  Currently on Gabapentin to 800 mg TID, discontinue  Has broken medication in half due to being dizzy, 400 mg is tolerable.   Prescribed 600 mg TID, currently on this dose, continue.     4. Hyperlipidemia  , elevated  ACC  ASCVR Risk score 6.3%  Patient does want to try medication now  Begin Fish oil  Recheck today.     5. History of Vitamin D deficiency   Current Vitamin D 52.2  Continue daily Vitamin D     6. Elevated alkaline phosphatase- resolved   Alkaline phosphatase is 15, repeat is 143  Will monitor for elevation     7. Obesity with cardiovascular risk factor  BMI 30.73, previously BMI 27.10  Cardiovascular risk factor- Hypertension  Continue medications  Was suppose to start Ozempic, discussed family history of thyroid cancer and side effects. Patient states she understands and agrees. But, the insurance will not cover.     8. Neuropathy  Continue Lyrica 100 mg BID      Health Maintenance:  Breast cancer screening- mammogram 05/05/2023 revealed BI-RADS 1, next screening 05/2025  Cervical cancer screening- referral completed, she has to call back since she missed their call.     Return to clinic in 4 weeks for blood pressure evaluation.     Need to discuss triglyceride level and options at next appointment.       Lolita Salazar MD  Internal Medicine  [facility]

## 2025-04-02 ENCOUNTER — TELEPHONE (OUTPATIENT)
Dept: INTERNAL MEDICINE | Facility: CLINIC | Age: 60
End: 2025-04-02
Payer: COMMERCIAL

## 2025-04-02 NOTE — TELEPHONE ENCOUNTER
Pt calling for a call back concerning some test she had done and she has questions about them  [phone]

## 2025-05-12 ENCOUNTER — HOSPITAL ENCOUNTER (OUTPATIENT)
Dept: RADIOLOGY | Facility: HOSPITAL | Age: 60
Discharge: HOME | End: 2025-05-12
Attending: INTERNAL MEDICINE
Payer: COMMERCIAL

## 2025-05-12 DIAGNOSIS — Z12.39 ENCOUNTER FOR SCREENING FOR MALIGNANT NEOPLASM OF BREAST, UNSPECIFIED SCREENING MODALITY: ICD-10-CM

## 2025-05-12 PROCEDURE — 77063 BREAST TOMOSYNTHESIS BI: CPT | Mod: 26,,, | Performed by: RADIOLOGY

## 2025-05-12 PROCEDURE — 77063 BREAST TOMOSYNTHESIS BI: CPT | Mod: TC

## 2025-05-12 PROCEDURE — 77067 SCR MAMMO BI INCL CAD: CPT | Mod: 26,,, | Performed by: RADIOLOGY

## 2025-05-27 ENCOUNTER — HOSPITAL ENCOUNTER (OUTPATIENT)
Dept: RADIOLOGY | Facility: HOSPITAL | Age: 60
Discharge: HOME | End: 2025-05-27
Attending: INTERNAL MEDICINE
Payer: COMMERCIAL

## 2025-05-27 DIAGNOSIS — R92.8 ABNORMAL MAMMOGRAM: ICD-10-CM

## 2025-05-27 PROCEDURE — 77065 DX MAMMO INCL CAD UNI: CPT | Mod: 26,RT,, | Performed by: RADIOLOGY

## 2025-05-27 PROCEDURE — 77061 BREAST TOMOSYNTHESIS UNI: CPT | Mod: 26,RT,, | Performed by: RADIOLOGY

## 2025-05-27 PROCEDURE — 77065 DX MAMMO INCL CAD UNI: CPT | Mod: TC,RT

## 2025-06-12 ENCOUNTER — DOCUMENTATION ONLY (OUTPATIENT)
Dept: SURGICAL ONCOLOGY | Facility: CLINIC | Age: 60
End: 2025-06-12
Payer: COMMERCIAL

## 2025-06-12 NOTE — PROGRESS NOTES
Patient completed both a bilateral screening and a diagnostic mammo on 05/21 and 05/27. Sally August RN reached out to patient to schedule her for an RTC but pt was at the [facility] and stated that she would call us back at a later time. cpl

## 2025-08-21 DIAGNOSIS — I10 HYPERTENSION, ESSENTIAL, BENIGN: ICD-10-CM

## 2025-08-21 DIAGNOSIS — E66.3 OVERWEIGHT (BMI 25.0-29.9): ICD-10-CM

## 2025-08-25 RX ORDER — AMLODIPINE BESYLATE 10 MG/1
10 TABLET ORAL DAILY
Qty: 30 TABLET | Refills: 6 | Status: SHIPPED | OUTPATIENT
Start: 2025-08-25

## 2025-08-25 RX ORDER — PANTOPRAZOLE SODIUM 40 MG/1
40 TABLET, DELAYED RELEASE ORAL DAILY
Qty: 30 TABLET | Refills: 6 | Status: SHIPPED | OUTPATIENT
Start: 2025-08-25

## 2025-08-25 RX ORDER — HYDROCHLOROTHIAZIDE 25 MG/1
25 TABLET ORAL DAILY
Qty: 30 TABLET | Refills: 6 | Status: SHIPPED | OUTPATIENT
Start: 2025-08-25 | End: 2026-08-25